# Patient Record
Sex: MALE | Race: WHITE | NOT HISPANIC OR LATINO | Employment: FULL TIME | URBAN - METROPOLITAN AREA
[De-identification: names, ages, dates, MRNs, and addresses within clinical notes are randomized per-mention and may not be internally consistent; named-entity substitution may affect disease eponyms.]

---

## 2017-12-21 ENCOUNTER — RADIANT APPOINTMENT (OUTPATIENT)
Dept: GENERAL RADIOLOGY | Facility: CLINIC | Age: 32
End: 2017-12-21
Attending: FAMILY MEDICINE
Payer: COMMERCIAL

## 2017-12-21 ENCOUNTER — OFFICE VISIT (OUTPATIENT)
Dept: FAMILY MEDICINE | Facility: CLINIC | Age: 32
End: 2017-12-21
Payer: COMMERCIAL

## 2017-12-21 VITALS
TEMPERATURE: 97.5 F | BODY MASS INDEX: 29.41 KG/M2 | DIASTOLIC BLOOD PRESSURE: 87 MMHG | HEIGHT: 70 IN | SYSTOLIC BLOOD PRESSURE: 142 MMHG | HEART RATE: 60 BPM | WEIGHT: 205.4 LBS

## 2017-12-21 DIAGNOSIS — M25.531 RIGHT WRIST PAIN: Primary | ICD-10-CM

## 2017-12-21 DIAGNOSIS — M25.531 RIGHT WRIST PAIN: ICD-10-CM

## 2017-12-21 PROCEDURE — 73110 X-RAY EXAM OF WRIST: CPT | Mod: RT

## 2017-12-21 PROCEDURE — 99214 OFFICE O/P EST MOD 30 MIN: CPT | Performed by: FAMILY MEDICINE

## 2017-12-21 RX ORDER — IBUPROFEN 600 MG/1
600 TABLET, FILM COATED ORAL EVERY 6 HOURS
Qty: 40 TABLET | Refills: 0 | Status: SHIPPED | OUTPATIENT
Start: 2017-12-21 | End: 2023-07-12

## 2017-12-21 ASSESSMENT — PAIN SCALES - GENERAL: PAINLEVEL: SEVERE PAIN (7)

## 2017-12-21 NOTE — PROGRESS NOTES
SUBJECTIVE:                                                    Fredi Menendez is 32 year old male   Chief Complaint   Patient presents with     Wrist Pain     Right wrist pain for 1.5 weeks. No injury noted, woke up with pain and throbbing in wrist. Has tried Tylenol to date no help. Pain with use, grasping, and rotation.         Problem list and histories reviewed & adjusted, as indicated.  Additional history: as documented    Patient Active Problem List   Diagnosis     Other joint derangement, not elsewhere classified, lower leg     ADD (attention deficit disorder)     CARDIOVASCULAR SCREENING; LDL GOAL LESS THAN 160     GERD (gastroesophageal reflux disease)     Hyperlipidemia LDL goal <130     Past Surgical History:   Procedure Laterality Date     SURGICAL HISTORY OF -   age 3    herniorrhaphy       Social History   Substance Use Topics     Smoking status: Never Smoker     Smokeless tobacco: Current User      Comment: chewed for a few years     Alcohol use Yes      Comment: 1-2 drinks per night     Family History   Problem Relation Age of Onset     Allergies Mother      DIABETES Father      CEREBROVASCULAR DISEASE Father      DIABETES Paternal Grandfather      CEREBROVASCULAR DISEASE Paternal Grandfather      Respiratory Maternal Grandmother      emphysema         Current Outpatient Prescriptions   Medication Sig Dispense Refill     order for DME Equipment being ordered: Splint 1 Device 0     ibuprofen (ADVIL/MOTRIN) 600 MG tablet Take 1 tablet (600 mg) by mouth every 6 hours 40 tablet 0     Allergies   Allergen Reactions     Nasacort [Corticosteroids]      Septra [Bactrim]      Recent Labs   Lab Test  01/26/12   0930  06/08/11   1130  03/24/11   0927   LDL  Cannot estimate LDL when triglyceride exceeds 400 mg/dL  121  87  Cannot estimate LDL when triglyceride exceeds 400 mg/dL   HDL  56   --   52   TRIG  579*   --   666*   CR   --    --   1.10   GFRESTIMATED   --    --   82   GFRESTBLACK   --    --    ">90   POTASSIUM   --    --   4.7      BP Readings from Last 3 Encounters:   12/21/17 142/87   03/21/16 114/80   08/10/12 132/76    Wt Readings from Last 3 Encounters:   12/21/17 205 lb 6.4 oz (93.2 kg)   03/21/16 205 lb (93 kg)   01/26/12 183 lb (83 kg)         ROS:  Constitutional, HEENT, cardiovascular, pulmonary, gi and gu systems are negative, except as otherwise noted.    OBJECTIVE:                                                    /87  Pulse 60  Temp 97.5  F (36.4  C) (Tympanic)  Ht 5' 9.5\" (1.765 m)  Wt 205 lb 6.4 oz (93.2 kg)  BMI 29.9 kg/m2  GENERAL APPEARANCE ADULT: Alert, no acute distress  MS: extremities normal, no peripheral edema  Left wrist exam normal.  Left wrist appearance and range of motion, swelling-slight, deformity-none, erythema-no, warmth-no, tenderness- left wrist entirely, normal range of motion  hand exam Normal hand appearance and range of motion, non-tender  SKIN: no suspicious lesions or rashes  NEURO: Alert, oriented, speech and mentation normal  PSYCH: mentation appears normal., affect and mood normal  Diagnostic Test Results:  Xray right wrist normal     ASSESSMENT/PLAN:                                                    1. Right wrist pain  Strain, overuse, recheck 2 weeks if not improved.  Wrist splint for comfort  - XR Wrist Right G/E 3 Views; Future  - order for DME; Equipment being ordered: Splint  Dispense: 1 Device; Refill: 0  - ibuprofen (ADVIL/MOTRIN) 600 MG tablet; Take 1 tablet (600 mg) by mouth every 6 hours  Dispense: 40 tablet; Refill: 0    Juana Conrad MD  [facility]    "

## 2017-12-21 NOTE — PATIENT INSTRUCTIONS
Scheduled over the counter pain meds.     Ibuprofen 600mg orally every 6 hours scheduled for 10 days and/or   Tylenol 650 mg two orally every 12 hours scheduled for 10 days       Can take additional 1300 mg in a 24 hour time     Taking pain medication on a schedule will help to get ahead of the pain.  You are not waiting for the pain to take the medicine.  An example of scheduled routine for every 6 hours is to take 600 mg of ibuprofen at 6 am, 12 pm, 6 pm, 12 am. If this is not working to manage your pain add Tylenol 1300 mg at 6 am and 6 pm.      If you get good pain relief and it returns when stopped consider using these medications longer.  Risks from long term use of ibuprofen and tylenol  are minimal.  Ibuprofen long term can give you stomach inflammation and stomach ulcers, taking with food helps prevent this.  Consuming tylenol when using alcohol regularily can stress the liver.      Maximum dose of ibuprofen is 3200 mg daily.  Maximum dose of Tylenol is 4000 mg a day.  Those with kidney or liver disease need to use less or none at all.   It is safe to use both Tylenol and ibuprofen together scheduled.  Expect the pain relief effect to be more than additive.    Remember that most narcotic pain medications may have ibuprofen or acetaminophen in them and that must be counted into the total amount of either medication for the 24 hours.

## 2017-12-21 NOTE — MR AVS SNAPSHOT
After Visit Summary   12/21/2017    Fredi Menendez    MRN: [id number]           Patient Information     Date Of Birth          1985        Visit Information        Provider Department      12/21/2017 1:00 PM Juana Conrad MD [facility]        Today's Diagnoses     Right wrist pain    -  1      Care Instructions    Scheduled over the counter pain meds.     Ibuprofen 600mg orally every 6 hours scheduled for 10 days and/or   Tylenol 650 mg two orally every 12 hours scheduled for 10 days       Can take additional 1300 mg in a 24 hour time     Taking pain medication on a schedule will help to get ahead of the pain.  You are not waiting for the pain to take the medicine.  An example of scheduled routine for every 6 hours is to take 600 mg of ibuprofen at 6 am, 12 pm, 6 pm, 12 am. If this is not working to manage your pain add Tylenol 1300 mg at 6 am and 6 pm.      If you get good pain relief and it returns when stopped consider using these medications longer.  Risks from long term use of ibuprofen and tylenol  are minimal.  Ibuprofen long term can give you stomach inflammation and stomach ulcers, taking with food helps prevent this.  Consuming tylenol when using alcohol regularily can stress the liver.      Maximum dose of ibuprofen is 3200 mg daily.  Maximum dose of Tylenol is 4000 mg a day.  Those with kidney or liver disease need to use less or none at all.   It is safe to use both Tylenol and ibuprofen together scheduled.  Expect the pain relief effect to be more than additive.    Remember that most narcotic pain medications may have ibuprofen or acetaminophen in them and that must be counted into the total amount of either medication for the 24 hours.            Follow-ups after your visit        Who to contact     If you have questions or need follow up information about today's clinic visit or your schedule please contact [facility] directly at  "[phone].  Normal or non-critical lab and imaging results will be communicated to you by MyChart, letter or phone within 4 business days after the clinic has received the results. If you do not hear from us within 7 days, please contact the clinic through [employer] or phone. If you have a critical or abnormal lab result, we will notify you by phone as soon as possible.  Submit refill requests through [employer] or call your pharmacy and they will forward the refill request to us. Please allow 3 business days for your refill to be completed.          Additional Information About Your Visit        [employer] Information     [employer] gives you secure access to your electronic health record. If you see a primary care provider, you can also send messages to your care team and make appointments. If you have questions, please call your primary care clinic.  If you do not have a primary care provider, please call [phone] and they will assist you.        Care EveryWhere ID     This is your Care EveryWhere ID. This could be used by other organizations to access your [place] medical records  [id number]        Your Vitals Were     Pulse Temperature Height BMI (Body Mass Index)          60 97.5  F (36.4  C) (Tympanic) 5' 9.5\" (1.765 m) 29.9 kg/m2         Blood Pressure from Last 3 Encounters:   12/21/17 142/87   03/21/16 114/80   08/10/12 132/76    Weight from Last 3 Encounters:   12/21/17 205 lb 6.4 oz (93.2 kg)   03/21/16 205 lb (93 kg)   01/26/12 183 lb (83 kg)                 Today's Medication Changes          These changes are accurate as of: 12/21/17  1:33 PM.  If you have any questions, ask your nurse or doctor.               Start taking these medicines.        Dose/Directions    ibuprofen 600 MG tablet   Commonly known as:  ADVIL/MOTRIN   Used for:  Right wrist pain   Started by:  Juana Conrad MD        Dose:  600 mg   Take 1 tablet (600 mg) by mouth every 6 hours   Quantity:  40 tablet   Refills:  0       " order for DME   Used for:  Right wrist pain   Started by:  Juana Conrad MD        Equipment being ordered: Splint   Quantity:  1 Device   Refills:  0         Stop taking these medicines if you haven't already. Please contact your care team if you have questions.     amoxicillin-clavulanate 875-125 MG per tablet   Commonly known as:  AUGMENTIN   Stopped by:  Juana Conrad MD                Where to get your medicines      These medications were sent to [place] [address] [facility] - [address] [address]     Phone:  [phone]     ibuprofen 600 MG tablet         Some of these will need a paper prescription and others can be bought over the counter.  Ask your nurse if you have questions.     Bring a paper prescription for each of these medications     order for DME                Primary Care Provider Office Phone # Fax #    Alex Martinez [phone] [address] [facility] [address]        Equal Access to Services     JULIO MARINELLI AH: Hadii niels sanchez hadasho Soomaali, waaxda luqadaha, qaybta kaalmada adeegyada, waxay idiin haycarolinen david rivera. So [facility] [phone].    ATENCIÓN: Si habla español, tiene a saleem disposición servicios gratuitos de asistencia lingüística. Llame al [phone].    We comply with applicable federal civil rights laws and [place] laws. We do not discriminate on the basis of race, color, national origin, age, disability, sex, sexual orientation, or gender identity.            Thank you!     Thank you for choosing [facility]  for your care. Our goal is always to provide you with excellent care. Hearing back from our patients is one way we can continue to improve our services. Please take a few minutes to complete the written survey that you may receive in the mail after your visit with us. Thank you!             Your Updated Medication List - Protect others around you: Learn how  to safely use, store and throw away your medicines at www.disposemymeds.org.          This list is accurate as of: 12/21/17  1:33 PM.  Always use your most recent med list.                   Brand Name Dispense Instructions for use Diagnosis    ibuprofen 600 MG tablet    ADVIL/MOTRIN    40 tablet    Take 1 tablet (600 mg) by mouth every 6 hours    Right wrist pain       order for DME     1 Device    Equipment being ordered: Splint    Right wrist pain

## 2017-12-21 NOTE — NURSING NOTE
"Initial /87  Pulse 60  Temp 97.5  F (36.4  C) (Tympanic)  Ht 5' 9.5\" (1.765 m)  Wt 205 lb 6.4 oz (93.2 kg)  BMI 29.9 kg/m2 Estimated body mass index is 29.9 kg/(m^2) as calculated from the following:    Height as of this encounter: 5' 9.5\" (1.765 m).    Weight as of this encounter: 205 lb 6.4 oz (93.2 kg). .      "

## 2019-11-03 ENCOUNTER — HEALTH MAINTENANCE LETTER (OUTPATIENT)
Age: 34
End: 2019-11-03

## 2020-11-16 ENCOUNTER — HEALTH MAINTENANCE LETTER (OUTPATIENT)
Age: 35
End: 2020-11-16

## 2021-09-12 ENCOUNTER — HEALTH MAINTENANCE LETTER (OUTPATIENT)
Age: 36
End: 2021-09-12

## 2022-01-02 ENCOUNTER — HEALTH MAINTENANCE LETTER (OUTPATIENT)
Age: 37
End: 2022-01-02

## 2022-08-25 ENCOUNTER — E-CONSULT (OUTPATIENT)
Dept: PULMONOLOGY | Facility: OTHER | Age: 37
End: 2022-08-25

## 2022-08-25 ENCOUNTER — OFFICE VISIT (OUTPATIENT)
Dept: FAMILY MEDICINE | Facility: CLINIC | Age: 37
End: 2022-08-25
Payer: COMMERCIAL

## 2022-08-25 VITALS
HEART RATE: 66 BPM | OXYGEN SATURATION: 99 % | RESPIRATION RATE: 16 BRPM | DIASTOLIC BLOOD PRESSURE: 88 MMHG | BODY MASS INDEX: 31.55 KG/M2 | TEMPERATURE: 96.8 F | HEIGHT: 70 IN | SYSTOLIC BLOOD PRESSURE: 132 MMHG | WEIGHT: 220.4 LBS

## 2022-08-25 DIAGNOSIS — G47.19 EXCESSIVE DAYTIME SLEEPINESS: ICD-10-CM

## 2022-08-25 DIAGNOSIS — R06.83 SNORING: Primary | ICD-10-CM

## 2022-08-25 DIAGNOSIS — R06.81 APNEA: ICD-10-CM

## 2022-08-25 DIAGNOSIS — R03.0 ELEVATED BLOOD PRESSURE READING WITHOUT DIAGNOSIS OF HYPERTENSION: ICD-10-CM

## 2022-08-25 DIAGNOSIS — Z00.00 ROUTINE PHYSICAL EXAMINATION: Primary | ICD-10-CM

## 2022-08-25 DIAGNOSIS — G47.10 HYPERSOMNIA: ICD-10-CM

## 2022-08-25 DIAGNOSIS — Z13.220 SCREENING FOR HYPERLIPIDEMIA: ICD-10-CM

## 2022-08-25 DIAGNOSIS — Z11.4 SCREENING FOR HIV (HUMAN IMMUNODEFICIENCY VIRUS): ICD-10-CM

## 2022-08-25 DIAGNOSIS — Z11.59 NEED FOR HEPATITIS C SCREENING TEST: ICD-10-CM

## 2022-08-25 DIAGNOSIS — R06.83 LOUD SNORING: ICD-10-CM

## 2022-08-25 LAB
ANION GAP SERPL CALCULATED.3IONS-SCNC: 5 MMOL/L (ref 3–14)
BUN SERPL-MCNC: 10 MG/DL (ref 7–30)
CALCIUM SERPL-MCNC: 8.8 MG/DL (ref 8.5–10.1)
CHLORIDE BLD-SCNC: 107 MMOL/L (ref 94–109)
CHOLEST SERPL-MCNC: 232 MG/DL
CO2 SERPL-SCNC: 26 MMOL/L (ref 20–32)
CREAT SERPL-MCNC: 0.78 MG/DL (ref 0.52–1.25)
ERYTHROCYTE [DISTWIDTH] IN BLOOD BY AUTOMATED COUNT: 12.1 % (ref 10–15)
FASTING STATUS PATIENT QL REPORTED: ABNORMAL
GFR SERPL CREATININE-BSD FRML MDRD: >90 ML/MIN/1.73M2
GLUCOSE BLD-MCNC: 109 MG/DL (ref 70–99)
HBA1C MFR BLD: 5.8 % (ref 0–5.6)
HCT VFR BLD AUTO: 47.3 % (ref 35–53)
HDLC SERPL-MCNC: 47 MG/DL
HGB BLD-MCNC: 16.5 G/DL (ref 11.7–17.7)
LDLC SERPL CALC-MCNC: 138 MG/DL
MCH RBC QN AUTO: 29.8 PG (ref 26.5–33)
MCHC RBC AUTO-ENTMCNC: 34.9 G/DL (ref 31.5–36.5)
MCV RBC AUTO: 86 FL (ref 78–100)
NONHDLC SERPL-MCNC: 185 MG/DL
PLATELET # BLD AUTO: 234 10E3/UL (ref 150–450)
POTASSIUM BLD-SCNC: 4.5 MMOL/L (ref 3.4–5.3)
RBC # BLD AUTO: 5.53 10E6/UL (ref 3.8–5.9)
SODIUM SERPL-SCNC: 138 MMOL/L (ref 133–144)
TRIGL SERPL-MCNC: 237 MG/DL
TSH SERPL DL<=0.005 MIU/L-ACNC: 1.85 MU/L (ref 0.4–4)
WBC # BLD AUTO: 6 10E3/UL (ref 4–11)

## 2022-08-25 PROCEDURE — 90715 TDAP VACCINE 7 YRS/> IM: CPT | Performed by: NURSE PRACTITIONER

## 2022-08-25 PROCEDURE — 80061 LIPID PANEL: CPT | Performed by: NURSE PRACTITIONER

## 2022-08-25 PROCEDURE — 87389 HIV-1 AG W/HIV-1&-2 AB AG IA: CPT | Performed by: NURSE PRACTITIONER

## 2022-08-25 PROCEDURE — 99385 PREV VISIT NEW AGE 18-39: CPT | Mod: 25 | Performed by: NURSE PRACTITIONER

## 2022-08-25 PROCEDURE — 84443 ASSAY THYROID STIM HORMONE: CPT | Performed by: NURSE PRACTITIONER

## 2022-08-25 PROCEDURE — 85027 COMPLETE CBC AUTOMATED: CPT | Performed by: NURSE PRACTITIONER

## 2022-08-25 PROCEDURE — 36415 COLL VENOUS BLD VENIPUNCTURE: CPT | Performed by: NURSE PRACTITIONER

## 2022-08-25 PROCEDURE — 99207 E-CONSULT TO SLEEP MEDICINE (ADULT OUTPT PROVIDER TO SPECIALIST WRITTEN QUESTION & RESPONSE): CPT | Performed by: NURSE PRACTITIONER

## 2022-08-25 PROCEDURE — 80048 BASIC METABOLIC PNL TOTAL CA: CPT | Performed by: NURSE PRACTITIONER

## 2022-08-25 PROCEDURE — 83036 HEMOGLOBIN GLYCOSYLATED A1C: CPT | Performed by: NURSE PRACTITIONER

## 2022-08-25 PROCEDURE — 99213 OFFICE O/P EST LOW 20 MIN: CPT | Mod: 25 | Performed by: NURSE PRACTITIONER

## 2022-08-25 PROCEDURE — 90471 IMMUNIZATION ADMIN: CPT | Performed by: NURSE PRACTITIONER

## 2022-08-25 PROCEDURE — 86803 HEPATITIS C AB TEST: CPT | Performed by: NURSE PRACTITIONER

## 2022-08-25 ASSESSMENT — ENCOUNTER SYMPTOMS
COUGH: 0
HEADACHES: 0
NAUSEA: 0
BREAST MASS: 0
SHORTNESS OF BREATH: 0
HEARTBURN: 0
FEVER: 0
SORE THROAT: 0
NERVOUS/ANXIOUS: 0
ABDOMINAL PAIN: 0
DIARRHEA: 0
JOINT SWELLING: 0
DYSURIA: 0
PALPITATIONS: 0
EYE PAIN: 0
PARESTHESIAS: 0
MYALGIAS: 0
HEMATOCHEZIA: 0
ARTHRALGIAS: 0
CONSTIPATION: 0
CHILLS: 0
FREQUENCY: 0
DIZZINESS: 0
HEMATURIA: 0
WEAKNESS: 0

## 2022-08-25 NOTE — PROGRESS NOTES
SUBJECTIVE:   CC: Fredi Menendez is an 37 year old male who presents for preventative health visit.       Patient has been advised of split billing requirements and indicates understanding: Yes  Healthy Habits:     Getting at least 3 servings of Calcium per day:  Yes    Bi-annual eye exam:  NO    Dental care twice a year:  NO    Sleep apnea or symptoms of sleep apnea:  Daytime drowsiness and Excessive snoring    Diet:  Regular (no restrictions)    Frequency of exercise:  2-3 days/week    Duration of exercise:  15-30 minutes    Taking medications regularly:  Not Applicable    Medication side effects:  None    PHQ-2 Total Score: 0    Additional concerns today:  No      Insomnia  Onset: 1-1.5 months   Description: can sleep all night and still not rested the next day- nods off at work, hard to focus  Time to fall asleep (sleep latency): 30 minutes  Middle of night awakening:  YES- not all the time  Early morning awakening:  YES    Progression of Symptoms:  Improving slightly    Accompanying Signs & Symptoms: heartburn, burns in throat at night-stopped eating later at night which helped   Daytime sleepiness/napping: YES  Excessive snoring/apnea: YES  Restless legs: no  Frequent urination: no   Chronic pain:  no    History:  Prior Insomnia: no     Precipitating factors:   New stressful situation: no  Caffeine intake: YES- moderate   OTC decongestants: no   Any new medications: no     Alleviating factors:  Self medicating (alcohol, etc.):  No    Therapies Tried and outcome: OTC sleep aids        Today's PHQ-2 Score:   PHQ-2 ( 1999 [employer]) 8/25/2022   Q1: Little interest or pleasure in doing things 0   Q2: Feeling down, depressed or hopeless 0   PHQ-2 Score 0   Q1: Little interest or pleasure in doing things Not at all   Q2: Feeling down, depressed or hopeless Not at all   PHQ-2 Score 0       Abuse: Current or Past(Physical, Sexual or Emotional)- No  Do you feel safe in your environment? Yes    Have you ever done  Advance Care Planning? (For example, a Health Directive, POLST, or a discussion with a medical provider or your loved ones about your wishes): No, advance care planning information given to patient to review.  Patient declined advance care planning discussion at this time.    Social History     Tobacco Use     Smoking status: Never Smoker     Smokeless tobacco: Current User     Tobacco comment: chewed for a few years   Substance Use Topics     Alcohol use: Yes     Comment: occ     If you drink alcohol do you typically have >3 drinks per day or >7 drinks per week? No    Alcohol Use 8/25/2022   Prescreen: >3 drinks/day or >7 drinks/week? No   Prescreen: >3 drinks/day or >7 drinks/week? -       Last PSA: No results found for: PSA    Reviewed orders with patient. Reviewed health maintenance and updated orders accordingly - Yes  BP Readings from Last 3 Encounters:   08/25/22 132/88   12/21/17 142/87   03/21/16 114/80    Wt Readings from Last 3 Encounters:   08/25/22 100 kg (220 lb 6.4 oz)   12/21/17 93.2 kg (205 lb 6.4 oz)   03/21/16 93 kg (205 lb)                  Patient Active Problem List   Diagnosis     Other joint derangement, not elsewhere classified, lower leg     ADD (attention deficit disorder)     CARDIOVASCULAR SCREENING; LDL GOAL LESS THAN 160     GERD (gastroesophageal reflux disease)     Hyperlipidemia LDL goal <130     Excessive daytime sleepiness     Elevated blood pressure reading without diagnosis of hypertension     Past Surgical History:   Procedure Laterality Date     SURGICAL HISTORY OF -   age 3    herniorrhaphy       Social History     Tobacco Use     Smoking status: Never Smoker     Smokeless tobacco: Current User     Tobacco comment: chewed for a few years   Substance Use Topics     Alcohol use: Yes     Comment: occ     Family History   Problem Relation Age of Onset     Allergies Mother      Diabetes Father      Cerebrovascular Disease Father      Diabetes Paternal Grandfather       "Cerebrovascular Disease Paternal Grandfather      Respiratory Maternal Grandmother         emphysema         Current Outpatient Medications   Medication Sig Dispense Refill     ibuprofen (ADVIL/MOTRIN) 600 MG tablet Take 1 tablet (600 mg) by mouth every 6 hours 40 tablet 0       Reviewed and updated as needed this visit by clinical staff   Tobacco  Allergies    Med Hx  Surg Hx  Fam Hx  Soc Hx          Reviewed and updated as needed this visit by Provider                     Review of Systems   Constitutional: Negative for chills and fever.   HENT: Negative for congestion, ear pain, hearing loss and sore throat.    Eyes: Negative for pain and visual disturbance.   Respiratory: Negative for cough and shortness of breath.    Cardiovascular: Negative for chest pain and palpitations.   Gastrointestinal: Negative for abdominal pain, constipation, diarrhea and nausea.   Genitourinary: Negative for dysuria, frequency, genital sores, hematuria and urgency.   Musculoskeletal: Negative for arthralgias, joint swelling and myalgias.   Skin: Negative for rash.   Neurological: Negative for dizziness, weakness and headaches.   Psychiatric/Behavioral: The patient is not nervous/anxious.          OBJECTIVE:   BP (!) 142/112   Pulse 66   Temp 96.8  F (36  C) (Tympanic)   Resp 16   Ht 1.765 m (5' 9.5\")   Wt 100 kg (220 lb 6.4 oz)   SpO2 99%   BMI 32.08 kg/m      Physical Exam  GENERAL: alert and no distress  EYES: Eyes grossly normal to inspection, PERRL and conjunctivae and sclerae normal  HENT: normal cephalic/atraumatic, ear canals and TM's normal, nose and mouth without ulcers or lesions, oropharynx clear, oral mucous membranes moist and oropharxnx crowded  NECK: no adenopathy, no asymmetry, masses, or scars and thyroid normal to palpation  RESP: lungs clear to auscultation - no rales, rhonchi or wheezes  CV: regular rate and rhythm, normal S1 S2, no S3 or S4, no murmur, click or rub, no peripheral edema and peripheral " "pulses strong  ABDOMEN: soft, nontender, without hepatosplenomegaly or masses and no palpable or pulsatile masses  MS: no gross musculoskeletal defects noted, no edema  SKIN: no suspicious lesions or rashes  NEURO: Normal strength and tone, mentation intact and speech normal  PSYCH: mentation appears normal, affect normal/bright    Diagnostic Test Results:  Labs reviewed in Epic    ASSESSMENT/PLAN:       ICD-10-CM    1. Routine physical examination  Z00.00 Basic metabolic panel  (Ca, Cl, CO2, Creat, Gluc, K, Na, BUN)     Hemoglobin A1c     Basic metabolic panel  (Ca, Cl, CO2, Creat, Gluc, K, Na, BUN)     Hemoglobin A1c   2. Excessive daytime sleepiness  G47.19 TSH with free T4 reflex     CBC with platelets     TSH with free T4 reflex     CBC with platelets     Adult E-Consult to Sleep Medicine (Outpt Provider to Specialist Written Question & Response)   3. Elevated blood pressure reading without diagnosis of hypertension  R03.0    4. Screening for HIV (human immunodeficiency virus)  Z11.4 HIV Antigen Antibody Combo     HIV Antigen Antibody Combo   5. Need for hepatitis C screening test  Z11.59 Hepatitis C Screen Reflex to HCV RNA Quant and Genotype     Hepatitis C Screen Reflex to HCV RNA Quant and Genotype   6. Screening for hyperlipidemia  Z13.220 Lipid panel reflex to direct LDL Non-fasting     Lipid panel reflex to direct LDL Non-fasting   7. Loud snoring  R06.83 Adult E-Consult to Sleep Medicine (Outpt Provider to Specialist Written Question & Response)           COUNSELING:   Reviewed preventive health counseling, as reflected in patient instructions  Special attention given to:        Regular exercise       Healthy diet/nutrition    Estimated body mass index is 32.08 kg/m  as calculated from the following:    Height as of this encounter: 1.765 m (5' 9.5\").    Weight as of this encounter: 100 kg (220 lb 6.4 oz).     Weight management plan: Discussed healthy diet and exercise guidelines    He reports that he " has never smoked. He uses smokeless tobacco.      Counseling Resources:  ATP IV Guidelines  Pooled Cohorts Equation Calculator  FRAX Risk Assessment  ICSI Preventive Guidelines  Dietary Guidelines for Americans, 2010  USDA's MyPlate  ASA Prophylaxis  Lung CA Screening    GILBERTO Sanabria CNP  [facility]

## 2022-08-25 NOTE — PROGRESS NOTES
ALL SMARTFIELDS MUST BE COMPLETED FOR PATIENT CARE AND BILLING    8/25/2022     E-Consult has been accepted.    Interprofessional consultation requested by:  Rosy Arrieta APRN CNP      Clinical Question/Purpose: MY CLINICAL QUESTION IS: need for sleep study    Patient assessment and information reviewed:     - Does the patient snore on most nights? Yes    - Is the patient excessively tired during the day? Yes    - Has the patient been observed to be stopping breathing in sleep or gasping/ choking in sleep? Yes- brings in recording of loud snoring and periods of pauses in the snoring- assuming apnea. Narrow airway on exam    - Does the patient have any of the following?    - Significant insomnia? No    - Any parasomnias (abnormal behaviors in sleep)? No    - Excessive leg movements in sleep? No    - If a sleep study is requested, does the patient prefer in lab sleep study or home sleep testing?    - Is the patient already on CPAP or BIPAP? No     BMI ~32.1.    STOP-BANG score of 4 (snore, hypersomnia, observed apnea, male gender) with unknown neck circumference.    Recommendations:     Appears to be appropriate candidate for home sleep testing.  Orders placed for [employer] home sleep testing, we will contact patient with results.      The recommendations provided in this E-Consult are based on a review of clinical data pertinent to the clinical question presented, without a review of the patient's complete medical record or, the benefit of a comprehensive in-person or virtual patient evaluation. This consultation should not replace the clinical judgement and evaluation of the provider ordering this E-Consult. Any new clinical issues, or changes in patient status since the filing of this E-Consult will need to be taken into account when assessing these recommendations. Please contact me if you have further questions.    My total time spent reviewing clinical information and formulating assessment was 5  minutes.    Report sent automatically to requesting provider once signed.     Son Kline MD, MD

## 2022-08-26 LAB
HCV AB SERPL QL IA: NONREACTIVE
HIV 1+2 AB+HIV1 P24 AG SERPL QL IA: NONREACTIVE

## 2022-08-26 NOTE — RESULT ENCOUNTER NOTE
"Huy Rai    Your blood counts are normal- no anemia.  You are pre-diabetic, recommend heart healthy diet and increase physical activity to avoid progressing to type 2 diabetes. Cholesterol levels are above ideal. No need for medication at this time, just dietary changes. Thyroid function is normal. Kidneys look good. Your sleep study was approved, you should be hearing from them. Please let us know if you have any questions.     Take care,    GILBERTO Walter CNP    TEST DESCRIPTIONS   CBC (Complete Blood Count) includes hemoglobin, hematocrit, white blood cells, etc. This test can be used to detect anemia, infection, and abnormalities in blood cells.   Cholesterol is one of the blood fats (lipids) and is the building block used by the body for cell wall and hormone production. Increased levels of cholesterol have been proven to directly contribute to heart disease and strokes.   Triglycerides are one of the blood fats (lipids) and are thought to be associated with heart disease. If you have had anything to eat or drink other than water for 12 hours before the test and your level is high, you should have the test repeated after a 12 hour fast. Abnormally high results may also be associated with diabetes, kidney and liver diseases.   LDL is the low density lipoprotein component of the cholesterol. It is the harmful substance that deposits cholesterol on the artery walls contributing to heart attacks and strokes. A high LDL level is associated with higher risk of coronary heart disease.   HDL stands for high density lipoprotein and refers to the so-called \"good\" cholesterol. HDL picks up excess cholesterol in the bloodstream and carries it back to the liver for disposal. Individuals with higher than average HDL seem to have a lower risk of coronary disease. Vigorous exercise will help increase the blood levels of HDL.   Risk Factor (Total cholesterol/HDL) is a commonly used ratio for cardiac risk " assessment. Less than 5.0 for men and 4.4 for women is ideal.   Sodium is an electrolyte useful in diagnosis of dehydration, diabetes, hypertension, or other diseases involving electrolyte imbalance. It also preserves the balance between calcium and potassium to maintain normal heart action and equilibrium of the body.   Potassium is also an electrolyte that works with sodium to regulate the body's water balance and normalize heart rhythm.   Glucose is a measure of blood sugar and is one of the tests for diabetes. If you have not been fasting, your level will often be high. A low glucose level may be a cause of weakness or dizziness. Blood sugar ranks with cholesterol as a causative factor in arteriosclerosis and heart attacks.   BUN & Creatinine are waste products excreted by the kidneys. A high BUN can be related to a high protein diet, heavy exercise, fever and infections, dehydration, kidney stones, and kidney disease. Creatinine elevation is less dependent on diet or exercise and better represents kidney impairment. Low values are not generally significant.   Calcium is a mineral in the blood controlled by the parathyroid glands and kidneys. It is important in the formation of bone, in muscle and nerve function, and in blood clotting. Disease of the parathyroid gland, diseased bones or kidneys, or defective absorption of calcium may cause abnormal levels from the intestine.   ALT, AST, Alk Phos are liver tests. Enzymes found in the liver as well as skeletal and cardiac muscle. Elevations can often be seen in alcoholism, liver or heart disease. Slightly abnormal values are not considered significant.   TSH stands for Thyroid Stimulating Hormone. A sensitive test used to determine how the thyroid gland is functioning. The thyroid gland produces hormones that control the body's metabolism. When too few hormones are produced (increased TSH), hypothyroidism occurs which can cause fatigue, sensitivity to cold, and  weight gain - an overall slowing down of bodily functions. When too many thyroid hormones are produces (or decreased TSH), it creates a condition call hyperthyroidism (such as Graves' disease) which causes rapid heartbeat, weight loss, and dizziness, among other symptoms.   PSA stands for Prostate Specific Antigen. Elevated levels of PSA can increase with trauma, infection, inflammation, or disease processes in the prostate such as BPH (Benigh Prostatic Hypertrophy) or cancer.   Glycohemoglobin or HgBA1C is a 3-month average of blood sugar

## 2022-09-08 ENCOUNTER — MYC MEDICAL ADVICE (OUTPATIENT)
Dept: FAMILY MEDICINE | Facility: CLINIC | Age: 37
End: 2022-09-08

## 2022-09-09 NOTE — TELEPHONE ENCOUNTER
Writer informed patient of sleep clinic number and Dr Kline's name via [employer] message.    Routed back to Yessenia Giordano for review.    Cole T[facility]

## 2022-09-09 NOTE — TELEPHONE ENCOUNTER
Huy Gallegos,     Please see my chart message.   I reviewed the e-consult and the order for home sleep test  How can I help expedite this? there is no phone number or direction in the consult  Forwarded to Yessenia Cerna RN on 9/9/2022 at 7:39 AM    Yessenia,   Not sure if you know how to help with home sleep tests?    Sara Cerna RN on 9/9/2022 at 7:45 AM

## 2022-09-09 NOTE — TELEPHONE ENCOUNTER
He should be getting a call from the sleep clinic on instructions for the home sleep test and scheduling a follow up. Dr. Kline did order this. You can call sleep and inquire.     [phone].

## 2022-11-04 ENCOUNTER — VIRTUAL VISIT (OUTPATIENT)
Dept: SLEEP MEDICINE | Facility: CLINIC | Age: 37
End: 2022-11-04
Payer: COMMERCIAL

## 2022-11-04 DIAGNOSIS — G47.10 HYPERSOMNIA: ICD-10-CM

## 2022-11-04 DIAGNOSIS — R06.81 APNEA: ICD-10-CM

## 2022-11-04 DIAGNOSIS — R06.83 SNORING: ICD-10-CM

## 2022-11-04 PROCEDURE — 95800 SLP STDY UNATTENDED: CPT | Performed by: STUDENT IN AN ORGANIZED HEALTH CARE EDUCATION/TRAINING PROGRAM

## 2022-11-04 NOTE — PROGRESS NOTES
WATCHPAT HOME SLEEP TEST WAS REGISTERED AND MAILED OUT TODAY VIA [facility].  PATIENT NOTIFIED BY [employer] MESSAGE

## 2022-11-14 NOTE — PROGRESS NOTES
Watch Pat has been scored using rule 1B, 4%.  Patient to follow up with provider to determine appropriate therapy.    PAT AHI: 82.8    Ordering Provider: Raghu Mackenzie MD

## 2022-11-19 ENCOUNTER — HEALTH MAINTENANCE LETTER (OUTPATIENT)
Age: 37
End: 2022-11-19

## 2022-11-30 NOTE — PROCEDURES
"WatchPAT - HOME SLEEP STUDY INTERPRETATION    Patient: Fredi Menendez  MRN: [id number]  YOB: 1985  Study Date: 11/4/2022  Referring Provider: System, Provider Not In  Ordering Provider: Kirsten Mackenzie MD      Chain of custody patient verification was not enabled.  Chain of custody verification was not present throughout the entire study.     Indications for Home Study: Fredi Menendez is a 37 year old adult with a history of HLD, GERD, ans ADD who who presents with symptoms suggestive of obstructive sleep apnea.    Estimated body mass index is 32.08 kg/m  as calculated from the following:    Height as of 8/25/22: 1.765 m (5' 9.5\").    Weight as of 8/25/22: 100 kg (220 lb 6.4 oz).  No data recorded  No data recorded    Data: A full night home sleep study was performed recording the standard physiologic parameters including peripheral arterial tonometry (PAT), sound/snoring, body position,  movement, sound, and oxygen saturation by pulse oximetry. Pulse rate was estimated by oximetry recording. Sleep staging (wake, REM, light, and deep sleep) was derived from PAT signal.  This study was considered adequate based on > 4 hours of quality oximetry and respiratory recording. As specified by the AASM Manual for the Scoring of Sleep and Associated events, version 2.3, Rule VIII.D 1B, 4% oxygen desaturation scoring for hypopneas is used as a standard of care on all home sleep apnea testing.    Total Recording Time: 7 hrs, 26 min  Total Sleep Time: 7 hrs, 12 min  % of Sleep Time REM: 11.3%    Respiratory:  Snoring: Snoring was present.  Respiratory events: The PAT respiratory disturbance index [pRDI] was 82.8 events per hour.  The PAT apnea/hypopnea index [pAHI] was 82.8 events per hour.  NIKHIL was 86.9 events per hour.  During REM sleep the pAHI was 72.0.  Of note the pAHIc was 26.2.  Sleep Associated Hypoxemia: sustained hypoxemia was present. Mean oxygen saturation was 87%.  Minimum was 61%.  " Time with saturation less than 88% was 212.8 minutes.    Heart Rate: By pulse oximetry normal rate was noted.     Position: Percent of time spent: supine -54%, prone -13.4%, on right -22.7%, on left -8.5%.  pAHI was 85.9 per hour supine, 81.5 per hour prone, 80.7 per hour on right side, and 74.1 per hour on left side.     Assessment:   Severe obstructive sleep apnea.  Sleep associated hypoxemia was present.    Recommendations:  Consider polysomnography with full night PAP titration.  Suggest optimizing sleep hygiene and avoiding sleep deprivation.  Weight management.    Diagnosis Code(s): Obstructive Sleep Apnea G47.33, Hypoxemia G47.36    Kristen Mackenzie MD, November 30, 2022   Diplomate, American Board of Internal Medicine, Sleep Medicine

## 2022-12-12 ENCOUNTER — VIRTUAL VISIT (OUTPATIENT)
Dept: SLEEP MEDICINE | Facility: CLINIC | Age: 37
End: 2022-12-12
Payer: COMMERCIAL

## 2022-12-12 VITALS — WEIGHT: 205 LBS | BODY MASS INDEX: 30.36 KG/M2 | HEIGHT: 69 IN

## 2022-12-12 DIAGNOSIS — G47.33 OBSTRUCTIVE SLEEP APNEA: Primary | ICD-10-CM

## 2022-12-12 PROCEDURE — 99215 OFFICE O/P EST HI 40 MIN: CPT | Mod: 95 | Performed by: STUDENT IN AN ORGANIZED HEALTH CARE EDUCATION/TRAINING PROGRAM

## 2022-12-12 ASSESSMENT — SLEEP AND FATIGUE QUESTIONNAIRES
HOW LIKELY ARE YOU TO NOD OFF OR FALL ASLEEP WHILE SITTING AND TALKING TO SOMEONE: MODERATE CHANCE OF DOZING
HOW LIKELY ARE YOU TO NOD OFF OR FALL ASLEEP WHEN YOU ARE A PASSENGER IN A CAR FOR AN HOUR WITHOUT A BREAK: HIGH CHANCE OF DOZING
HOW LIKELY ARE YOU TO NOD OFF OR FALL ASLEEP WHILE SITTING AND READING: HIGH CHANCE OF DOZING
HOW LIKELY ARE YOU TO NOD OFF OR FALL ASLEEP WHILE LYING DOWN TO REST IN THE AFTERNOON WHEN CIRCUMSTANCES PERMIT: HIGH CHANCE OF DOZING
HOW LIKELY ARE YOU TO NOD OFF OR FALL ASLEEP WHILE WATCHING TV: HIGH CHANCE OF DOZING
HOW LIKELY ARE YOU TO NOD OFF OR FALL ASLEEP WHILE SITTING QUIETLY AFTER LUNCH WITHOUT ALCOHOL: HIGH CHANCE OF DOZING
HOW LIKELY ARE YOU TO NOD OFF OR FALL ASLEEP IN A CAR, WHILE STOPPED FOR A FEW MINUTES IN TRAFFIC: HIGH CHANCE OF DOZING
HOW LIKELY ARE YOU TO NOD OFF OR FALL ASLEEP WHILE SITTING INACTIVE IN A PUBLIC PLACE: HIGH CHANCE OF DOZING

## 2022-12-12 ASSESSMENT — PAIN SCALES - GENERAL: PAINLEVEL: NO PAIN (0)

## 2022-12-12 NOTE — PROGRESS NOTES
Fredi is a 37 year old who is being evaluated via a billable video visit.      How would you like to obtain your AVS? MyChart  If the video visit is dropped, the invitation should be resent by: Text to cell phone: [phone]  Will anyone else be joining your video visit? Lauren Dawson    Video-Visit Details    Video Start Time: 4:01 PM    Type of service:  Video Visit    Video End Time:4:26 PM    Originating Location (pt. Location): Home    Distant Location (provider location):  On-site    Platform used for Video Visit: Joint venture between [facility]  Consultation Note    Name: Fredi Menendez MRN#: [id number]   Age: 37 year old YOB: 1985     Date of Consultation: 12/12/22  Consultation is requested by: No ref. provider found  Primary care provider: Provider Not In System    History of Present Illness:   Fredi Menendez is a 37 year old adult patient with no significant medical history  He is sent by No ref. provider found for a sleep consultation regarding Video Visit  .    Fredi NARENDRA Menendez main reason for visit: Sleep apnea  Patient states problem(s) started: 3 months ago  Fredi Menendez's goals for this visit: Figure out my sleep study    He has had a previous sleep study about several weeks ago.   Important findings: The PAT apnea/hypopnea index [pAHI] was 82.8 events per hour.  NIKHIL was 86.9 events per hour. During REM sleep the pAHI was 72.0.  Of note the pAHIc was 26.2.Sleep Associated Hypoxemia: sustained hypoxemia was present. Mean oxygen saturation was 87%.  Minimum was 61%.  Time with saturation less than 88% was 212.8 minutes.   .    CPAP: No    SLEEP-WAKE SCHEDULE:   Work/School Days: Patient goes to school/work: Yes   Usually gets into bed at 7 or 8pm  Takes patient about 10 to 20 minutes to fall asleep  Has trouble falling asleep 1if any nights per week  Wakes up in the middle of the night At least 2 on average times.  Wakes up due to  Uncertain;Other  He has trouble falling back asleep None times a week.   It usually takes Right away to get back to sleep  Patient is usually up at 3am  Uses alarm: Yes    Weekends/Non-work Days/All Other Days:  Usually gets into bed at 7 or 8 pm   Takes patient about 10 to 20 minutes to fall asleep  Patient is usually up at 3am  Uses alarm: Yes    Sleep Need  Patient gets  7 hours sleep on average   Patient thinks He needs about 6 hours sleep    Fredi Menendez prefers to sleep in this position(s): Back;Head Elevated   Patient states they do the following activities in bed:      Naps  Patient takes a purposeful nap 1 or 2 times a week and naps are usually 3 hours in duration  He feels better after a nap: No  He dozes off unintentionally 7 days a week days per week  Patient has had a driving accident or near-miss due to sleepiness/drowsiness: No      SLEEP DISRUPTIONS:  Breathing/Snoring  Patient snores:Yes  Other people complain about His snoring: Yes  Patient has been told He stops breathing in His sleep:Yes  He has issues with the following: Morning mouth dryness;Heartburn or reflux at night;Getting up to urinate more than once    Movement:  Patient gets pain, discomfort, with an urge to move:  No  It happens when He is resting:  No  It happens more at night:  No  Patient has been told Fredi Menendez kicks His legs at night:  No     Behaviors in Sleep:  Fredi Menendez has experienced the following behaviors while sleeping:    He has experienced sudden muscle weakness during the day: No     Is there anything else you would like your sleep provider to know: Super tired in morning dose off in seconds even while standing or driving    CAFFEINE AND OTHER SUBSTANCES:  Patient consumes caffeinated beverages per day:  Since the sleep apnea at least 4 to keep awake, had 0 to 1 before this  Last caffeine use is usually: 11am  List of any prescribed or over the counter stimulants that patient takes:    List  of any prescribed or over the counter sleep medication patient takes:    List of previous sleep medications that patient has tried:    Patient drinks alcohol to help them sleep: No  Patient drinks alcohol near bedtime: No    Family History:  Patient has a family member been diagnosed with a sleep disorder: Yes  Sleep apnea         SCALES:  EPWORTH SLEEPINESS SCALE    Lacon Sleepiness Scale ( RADHA Stoner  [id number]<br>ESS - [place]/English - Final version - 21 Nov 07 - [facility] [place].) 12/12/2022   Sitting and reading High chance of dozing   Watching TV High chance of dozing   Sitting, inactive in a public place (e.g. a theatre or a meeting) High chance of dozing   As a passenger in a car for an hour without a break High chance of dozing   Lying down to rest in the afternoon when circumstances permit High chance of dozing   Sitting and talking to someone Moderate chance of dozing   Sitting quietly after a lunch without alcohol High chance of dozing   In a car, while stopped for a few minutes in traffic High chance of dozing   Lacon Score (MC) 23   Lacon Score (Sleep) 23       INSOMNIA SEVERITY INDEX (BLANK)    Insomnia Severity Index (BLANK) 12/12/2022   Difficulty falling asleep 0   Difficulty staying asleep 2   Problems waking up too early 2   How SATISFIED/DISSATISFIED are you with your CURRENT sleep pattern? 3   How NOTICEABLE to others do you think your sleep problem is in terms of impairing the quality of your life? 3   How WORRIED/DISTRESSED are you about your current sleep problem? 4   To what extent do you consider your sleep problem to INTERFERE with your daily functioning (e.g. daytime fatigue, mood, ability to function at work/daily chores, concentration, memory, mood, etc.) CURRENTLY? 4   BLANK Total Score 18   Guidelines for Scoring/Interpretation:  Total score categories:  0-7 = No clinically significant insomnia   8-14 = Subthreshold insomnia   15-21 = Clinical insomnia (moderate severity)  22-28  = Clinical insomnia (severe)  Used via courtesy of www.myhealth.va.gov with permission from Emile Wolf PhD., [facility]      STOP BANG   STOP BANG Questionnaire (  2008, the American Society of Anesthesiologists, Inc. [employer] 12/12/2022   1. Snoring - Do you snore loudly (louder than talking or loud enough to be heard through closed doors)? Yes   2. Tired - Do you often feel tired, fatigued, or sleepy during daytime? Yes   3. Observed - Has anyone observed you stop breathing during your sleep? Yes   4. Blood pressure - Do you have or are you being treated for high blood pressure? No   5. BMI - BMI more than 35 kg/m2? No   6. Age - Age over 50 yr old? No   7. Neck circumference - Neck circumference greater than 40 cm? No   8. Gender - Gender male? Yes   STOP BANG Score (MC): 4 (High risk of RICARDA)   B/P Clinic: -   BMI Clinic: 30.27     PATIENT HEALTH QUESTIONNAIRE-9 (PHQ - 9)  PHQ-9 ([employer]) 3/24/2011   No Interest In Doing Things 1   Feeling Depressed 0   Trouble Sleeping 0   Tired / No Energy 1   No appetite or Over-Eating 0   Feeling Bad about Self 0   Trouble Concentrating 0   Moving Slow or Restless 0   Suicidal Thoughts 0   Total Score 2   Developed by Gela Suero, Kerry Dee, Pillo Rene and colleagues, with an educational willis from [employer] No permission required to reproduce, translate, display or distribute.      Allergies:    Allergies   Allergen Reactions     Nasacort [Corticosteroids]      Septra [Bactrim]        Medications:    Current Outpatient Medications   Medication Sig Dispense Refill     ibuprofen (ADVIL/MOTRIN) 600 MG tablet Take 1 tablet (600 mg) by mouth every 6 hours 40 tablet 0       Problem List:  Patient Active Problem List    Diagnosis Date Noted     Obstructive sleep apnea 12/12/2022     Priority: Medium     The PAT apnea/hypopnea index [pAHI] was 82.8 events per hour.  NIKHIL was 86.9 events per hour. During REM sleep the  pAHI was 72.0.  Of note the pAHIc was 26.2.Sleep Associated Hypoxemia: sustained hypoxemia was present. Mean oxygen saturation was 87%.  Minimum was 61%.  Time with saturation less than 88% was 212.8 minutes.        Excessive daytime sleepiness 08/25/2022     Priority: Medium     Elevated blood pressure reading without diagnosis of hypertension 08/25/2022     Priority: Medium     Hyperlipidemia LDL goal <130 03/27/2011     Priority: Medium     GERD (gastroesophageal reflux disease) 03/24/2011     Priority: Medium     CARDIOVASCULAR SCREENING; LDL GOAL LESS THAN 160 10/31/2010     Priority: Medium     ADD (attention deficit disorder) 07/10/2009     Priority: Medium     Other joint derangement, not elsewhere classified, lower leg 08/08/2005     Priority: Medium        Past Medical/Surgical History:  Past Medical History:   Diagnosis Date     ADD (attention deficit disorder with hyperactivity) 2009     Past Surgical History:   Procedure Laterality Date     SURGICAL HISTORY OF -   age 3    herniorrhaphy       Social History:  Social History     Socioeconomic History     Marital status: Single     Spouse name: Not on file     Number of children: Not on file     Years of education: Not on file     Highest education level: Not on file   Occupational History     Not on file   Tobacco Use     Smoking status: Never     Smokeless tobacco: Current     Tobacco comments:     chewed for a few years   Substance and Sexual Activity     Alcohol use: Yes     Comment: occ     Drug use: No     Sexual activity: Yes   Other Topics Concern      Service No     Blood Transfusions No     Caffeine Concern Yes     Comment: 3-4 cans a day     Occupational Exposure No     Hobby Hazards No     Sleep Concern No     Stress Concern No     Weight Concern No     Special Diet No     Back Care Yes     Comment: chiropractor 1 month entire body     Exercise Yes     Bike Helmet No     Seat Belt Yes     Self-Exams Not Asked     Parent/sibling w/  "CABG, MI or angioplasty before 65F 55M? No   Social History Narrative     Not on file     Social Determinants of Health     Financial Resource Strain: Not on file   Food Insecurity: Not on file   Transportation Needs: Not on file   Physical Activity: Not on file   Stress: Not on file   Social Connections: Not on file   Intimate Partner Violence: Not on file   Housing Stability: Not on file       Family History:  Family History   Problem Relation Age of Onset     Allergies Mother      Diabetes Father      Cerebrovascular Disease Father      Diabetes Paternal Grandfather      Cerebrovascular Disease Paternal Grandfather      Respiratory Maternal Grandmother         emphysema       Review of Systems:   A complete review of systems reviewed by me is negative with the exeption of what has been mentioned in the history of present illness.  In the last TWO WEEKS have you experienced any of the following symptoms?  Fevers: No  Night Sweats: No  Weight Gain: No  Pain at Night: No  Double Vision: No  Changes in Vision: No  Difficulty Breathing through Nose: No  Sore Throat in Morning: Yes  Dry Mouth in the Morning: Yes  Shortness of Breath Lying Flat: No  Shortness of Breath With Activity: No  Awakening with Shortness of Breath: No  Increased Cough: Yes  Heart Racing at Night: No  Swelling in Feet or Legs: No  Diarrhea at Night: No  Heartburn at Night: No  Urinating More than Once at Night: Yes  Losing Control of Urine at Night: No  Joint Pains at Night: No  Headaches in Morning: No  Weakness in Arms or Legs: No  Depressed Mood: No  Anxiety: No     Physical Exam:   Vitals: Ht 1.753 m (5' 9\")   Wt 93 kg (205 lb)   BMI 30.27 kg/m    BMI= Body mass index is 30.27 kg/m .     GENERAL: Healthy, alert and no distress  EYES: Eyes grossly normal to inspection.  No discharge or erythema, or obvious scleral/conjunctival abnormalities.  RESP: No audible wheeze, cough, or visible cyanosis.  No visible retractions or increased work of " breathing.    SKIN: Visible skin clear. No significant rash, abnormal pigmentation or lesions.  NEURO: Cranial nerves grossly intact.  Mentation and speech appropriate for age.  PSYCH: Mentation appears normal, affect normal/bright, judgement and insight intact, normal speech and appearance well-groomed.         Data: All pertinent previous laboratory data reviewed     Recent Labs   Lab Test 08/25/22 0928      POTASSIUM 4.5   CHLORIDE 107   CO2 26   ANIONGAP 5   *   BUN 10   CR 0.78   GIOVANNI 8.8       Recent Labs   Lab Test 08/25/22 0928   WBC 6.0   RBC 5.53   HGB 16.5   HCT 47.3   MCV 86   MCH 29.8   MCHC 34.9   RDW 12.1          No results for input(s): PROTTOTAL, ALBUMIN, BILITOTAL, ALKPHOS, AST, ALT, BILIDIRECT in the last 22312 hours.    TSH (mU/L)   Date Value   08/25/2022 1.85   03/24/2009 4.14       No results found for: UAMP, UBARB, BENZODIAZEUR, UCANN, UCOC, OPIT, UPCP    No results found for: IRONSAT, [id number], AFIA    No results found for: PH, PHARTERIAL, PO2, [id number], SAT, PCO2, HCO3, BASEEXCESS, RUPERT, BEB    @LABRCNTIPR(phv:4,pco2v:4,po2v:4,hco3v:4,xenia:4,o2per:4)@    Echocardiology: No results found for this or any previous visit (from the past 4320 hour(s)).    Chest x-ray: No results found for this or any previous visit from the past 365 days.      Chest CT: No results found for this or any previous visit from the past 365 days.      PFT: Most Recent Breeze Pulmonary Function Testing  No results found    Assessment and Plan:     Problem List Items Addressed This Visit        Respiratory    Obstructive sleep apnea - Primary     Sleep study showed Severe obstructive sleep apnea with an AHI of 82.8. Sleep related hypoxemia was present and significant with 213 minutes with O2 saturation less than 88%.  Additional findings from the sleep study showed abnormal findings including REM AHI was 72 and pAHIc was 26.2      Following discussion with patient a shared decision was made to obtain  a polysomnography with full night PAP titration ordered as an urgent study due to the severity of his RICARDA, sleep-related hypoxemia, and EDS 23/24 (3/3 with driving)    Additionally patient would benefit from titration study due to the presence of central sleep apnea on diagnostic HST, may benefit from bilevel PAP therapy if necessary during titration    Ordered pre PSG COVID test           Relevant Orders    Comprehensive Sleep Study    Asymptomatic COVID-19 Virus (Coronavirus) by PCR     Patient was strongly advised to avoid driving, operating any heavy machinery or other hazardous situations while drowsy or sleepy.  Patient was counseled on the importance of driving while alert, to pull over if drowsy, or nap before getting into the vehicle if sleepy.      Plan is for Deangeloxena MACKEY Menendez to follow up will be determined following titration PSG.     The above note was dictated using voice recognition software. Although reviewed after completion, some word and grammatical error may remain . Please contact the author for any clarifications.    Total time spent reviewing medical records, history and physical examination, review of previous testing and interpretation as well as documentation on this date, 12/12/22: 50 minutes    Kristen Mackenzie MD on 10/20/2022   [facility]   [address], [address]   [address] [place] [address]   Appointments: [phone]      CC: No ref. provider found

## 2022-12-12 NOTE — PATIENT INSTRUCTIONS
MY INFORMATION ON SLEEP APNEA-  Fredi Menendez    DOCTOR : Kristen Mackenzie MD  SLEEP CENTER :      MY CONTACT NUMBER:    [facility]  [phone]  [facility]      For general sleep health questions:   [email or web address]    For tips about PAP and COVID-19:  https://www.thoracic.org/patients/patient-resources/resources/covid-19-and-home-pap-therapy.pdf    For general info about COVID-19 including vaccines:  [email or web address]      Continue PAP therapy every night, for all hours that you are sleeping (including naps.)  As always, try to get at least 8 hours of sleep or more each day, keep a regular sleep schedule, and avoid sleep deprivation. Avoid alcohol.  Reasons that you might need a change to your pressure therapy would be weight gain or loss, waking having inadvertently removed your PAP overnight, having previously felt refreshed by sleep with CPAP use and now waking un-refreshed, and return of daytime sleepiness. Also, the development of new medical problems  (such as heart failure, stroke, medications such as narcotics) can sometimes affect breathing at night and change your PAP therapy needs.  Please bring PAP with you if you are hospitalized.  If anticipating surgery be sure to discuss with your surgeon that you have sleep apnea and use PAP therapy.    Maintain your equipment as recommended which includes routine cleaning and replacement of supplies.      Call DME for any questions regarding supplies or maintenance.    [facility], [facility] [phone]    Do not drive on engage in potentially dangerous activities if feeling sleepy.  Please follow up in sleep clinic again in 3 months.      Tips for your PAP use-    Mask fitting tips  Mask fitting exercise:    To improve your mask seal and your mobility at night, put mask on and secure in place.  Lie down in bed with full pressure and  roll to one side, adjust headgear while in that position to eliminate any leaks. Repeat process rolling to other side.     The mask seal does not have to be perfect:   CPAP machines are designed to make up for small leaks. However, you will not tolerate leaks blowing in your eyes so you will need to adjust.   Any leak should only be near or at the bottom of the mask.  We expect your mask to leak slightly at night.    Do not over-tighten the headgear straps, tighter IS NOT better, we expect minimal leak.    First try re-positioning the mask or headgear before tightening the headgear straps.  Mask leaks are expected due to changing sleeping positions. Try pulling the mask away from your skin allowing the cushion to re-inflate will minimize the leak.  If you struggle for a good fit, try turning the CPAP off and then readjust the mask by pulling it away from your face and then turning back on the CPAP.        Humidifier tips  Humidifiers can be adjusted to increase or decrease the amount of moisture according to your comfort level. You may need to adjust this frequently at first, but then might only change it with seasonal weather changes.     Try INCREASING the humidity if:  You experience a dry, irritated nasal passage or throat.  You have a runny, drippy nose or sneezing fits after using CPAP.  You experience nasal congestion during or after CPAP use.    Try DECREASING the humidity if:  You have excessive condensation or  rain out  in the tubing or mask.  Otherwise keep the tubing warm during the night by running it underneath the blankets or pillow.      Clinic visit after initial PAP set-up   Bring your equipment with you to your 5-8 week follow up clinic visit.  We will be extracting your data from the machine if not available from the cloud based [email or web address].        Travel  Always take your equipment with you when you travel.  If you fly with your equipment bring it on with you as a carry on.  Medical equipment does  not count as a carry on.    If you travel international the machines take 110-240v.  The only adapter needed is the adapter that will fit into the receptacle (outlet).    You may also want to bring an extension cord as many hotel rooms have limited outlets at the bedside.  Do not travel with water in your humidifier chamber.     Cleaning and Maintenance Guidelines    Equipment Frequency Cleaning Method   Mask First Day    Daily      Weekly Soak mask in hot soapy water for 30 minutes, rinse and air dry.  Wipe nasal cushion with a hot soapy (Ivory, baby shampoo) cloth and rinse.  Baby wipes may also be used.  Do not use anti-bacterial soaps,Concepcion  liquid soap, rubbing alcohol, bleach or ammonia.  Wash frame in hot soapy water (Ivory, baby shampoo) rinse and let air dry   Headgear Biweekly Wash in hot soapy water, rinse and air dry   Reusable Gray Filter Weekly Wash in hot soapy water, rinse, put in towel squeeze moisture out, let air dry   Disposable White Filter Check Weekly Replace when brown or gray in color; at least every 2 to 3 months   Humidifier Chamber Daily    Weekly Empty distilled water from humidifier and let air dry    Hand wash in hot soapy water, rinse and air dry   Tubing Weekly Wash in hot soapy water, rinse and let air dry   Mask, Tubing and Humidifier Chamber As needed Disinfect: Soak in 1 part distilled white vinegar to 3 parts hot water for 30 minutes, rinse well and air dry  Not the material headgear        MASK AND SUPPLY REORDERING and EQUIPMENT NEEDS through your DME and per your insurance  Reminder: Most insurance companies will allow for a new mask, headgear, tubing, and reusable gray filter every six months.  Disposable white ultra-fine filters are covered monthly.      HOME AND SAFETY INSTRUCTIONS  Do not use frayed or cracked electrical cords, multi plug adaptors, or switched receptacles  Do not immerse electrical equipment into water  Assure that electrical cords do not become a tripping  hazard

## 2022-12-12 NOTE — ASSESSMENT & PLAN NOTE
Sleep study showed Severe obstructive sleep apnea with an AHI of 82.8. Sleep related hypoxemia was present and significant with 213 minutes with O2 saturation less than 88%.  Additional findings from the sleep study showed abnormal findings including REM AHI was 72 and pAHIc was 26.2      Following discussion with patient a shared decision was made to obtain a polysomnography with full night PAP titration ordered as an urgent study due to the severity of his RICARDA, sleep-related hypoxemia, and EDS 23/24 (3/3 with driving)    Additionally patient would benefit from titration study due to the presence of central sleep apnea on diagnostic HST, may benefit from bilevel PAP therapy if necessary during titration    Ordered pre PSG COVID test

## 2023-02-14 ENCOUNTER — LAB (OUTPATIENT)
Dept: LAB | Facility: CLINIC | Age: 38
End: 2023-02-14
Payer: COMMERCIAL

## 2023-02-14 DIAGNOSIS — G47.33 OBSTRUCTIVE SLEEP APNEA: ICD-10-CM

## 2023-02-14 LAB — SARS-COV-2 RNA RESP QL NAA+PROBE: NEGATIVE

## 2023-02-14 PROCEDURE — U0003 INFECTIOUS AGENT DETECTION BY NUCLEIC ACID (DNA OR RNA); SEVERE ACUTE RESPIRATORY SYNDROME CORONAVIRUS 2 (SARS-COV-2) (CORONAVIRUS DISEASE [COVID-19]), AMPLIFIED PROBE TECHNIQUE, MAKING USE OF HIGH THROUGHPUT TECHNOLOGIES AS DESCRIBED BY CMS-2020-01-R: HCPCS

## 2023-02-14 PROCEDURE — U0005 INFEC AGEN DETEC AMPLI PROBE: HCPCS

## 2023-02-17 ENCOUNTER — THERAPY VISIT (OUTPATIENT)
Dept: SLEEP MEDICINE | Facility: CLINIC | Age: 38
End: 2023-02-17
Payer: COMMERCIAL

## 2023-02-17 DIAGNOSIS — G47.33 OBSTRUCTIVE SLEEP APNEA: ICD-10-CM

## 2023-02-17 LAB — SLPCOMP: NORMAL

## 2023-02-17 PROCEDURE — 95811 POLYSOM 6/>YRS CPAP 4/> PARM: CPT | Performed by: STUDENT IN AN ORGANIZED HEALTH CARE EDUCATION/TRAINING PROGRAM

## 2023-02-17 ASSESSMENT — SLEEP AND FATIGUE QUESTIONNAIRES
HOW LIKELY ARE YOU TO NOD OFF OR FALL ASLEEP WHEN YOU ARE A PASSENGER IN A CAR FOR AN HOUR WITHOUT A BREAK: HIGH CHANCE OF DOZING
HOW LIKELY ARE YOU TO NOD OFF OR FALL ASLEEP WHILE LYING DOWN TO REST IN THE AFTERNOON WHEN CIRCUMSTANCES PERMIT: HIGH CHANCE OF DOZING
HOW LIKELY ARE YOU TO NOD OFF OR FALL ASLEEP WHILE SITTING AND TALKING TO SOMEONE: SLIGHT CHANCE OF DOZING
HOW LIKELY ARE YOU TO NOD OFF OR FALL ASLEEP IN A CAR, WHILE STOPPED FOR A FEW MINUTES IN TRAFFIC: HIGH CHANCE OF DOZING
HOW LIKELY ARE YOU TO NOD OFF OR FALL ASLEEP WHILE SITTING QUIETLY AFTER LUNCH WITHOUT ALCOHOL: HIGH CHANCE OF DOZING
HOW LIKELY ARE YOU TO NOD OFF OR FALL ASLEEP WHILE SITTING AND READING: HIGH CHANCE OF DOZING
HOW LIKELY ARE YOU TO NOD OFF OR FALL ASLEEP WHILE SITTING INACTIVE IN A PUBLIC PLACE: MODERATE CHANCE OF DOZING
HOW LIKELY ARE YOU TO NOD OFF OR FALL ASLEEP WHILE WATCHING TV: MODERATE CHANCE OF DOZING

## 2023-02-18 NOTE — PATIENT INSTRUCTIONS
[facility]    1. Your sleep study will be reviewed by a sleep physician within the next few days.     2. Please follow up in the sleep clinic as scheduled, or, make an appointment with your sleep provider to be seen within two weeks to discuss the results of the sleep study.    3. If you have any questions or problems with your treatment plan, please contact your sleep clinic provider at [phone] to further manage your condition.    4. Please review your attached medication list, and, at your follow-up appointment advise your sleep clinic provider about any changes.    5. Go to [email or web address] for more information about your sleep problems.    Cole Spain, KANIKAGT  February 18, 2023

## 2023-02-18 NOTE — NURSING NOTE
Completed a all night titration PSG per provider order.    Preliminary AHI 82.8.  A final therapeutic PAP pressure was achieved.    Supine REM was seen on therapeutic pressure.    Patient reports feeling refreshed in AM.

## 2023-03-03 ENCOUNTER — VIRTUAL VISIT (OUTPATIENT)
Dept: SLEEP MEDICINE | Facility: CLINIC | Age: 38
End: 2023-03-03
Payer: COMMERCIAL

## 2023-03-03 VITALS — BODY MASS INDEX: 30.36 KG/M2 | HEIGHT: 69 IN | WEIGHT: 205 LBS

## 2023-03-03 DIAGNOSIS — G47.33 OBSTRUCTIVE SLEEP APNEA: Primary | ICD-10-CM

## 2023-03-03 PROCEDURE — 99214 OFFICE O/P EST MOD 30 MIN: CPT | Mod: VID | Performed by: STUDENT IN AN ORGANIZED HEALTH CARE EDUCATION/TRAINING PROGRAM

## 2023-03-03 ASSESSMENT — SLEEP AND FATIGUE QUESTIONNAIRES
HOW LIKELY ARE YOU TO NOD OFF OR FALL ASLEEP WHILE LYING DOWN TO REST IN THE AFTERNOON WHEN CIRCUMSTANCES PERMIT: HIGH CHANCE OF DOZING
HOW LIKELY ARE YOU TO NOD OFF OR FALL ASLEEP WHILE SITTING AND TALKING TO SOMEONE: MODERATE CHANCE OF DOZING
HOW LIKELY ARE YOU TO NOD OFF OR FALL ASLEEP WHILE SITTING INACTIVE IN A PUBLIC PLACE: MODERATE CHANCE OF DOZING
HOW LIKELY ARE YOU TO NOD OFF OR FALL ASLEEP WHILE WATCHING TV: HIGH CHANCE OF DOZING
HOW LIKELY ARE YOU TO NOD OFF OR FALL ASLEEP WHEN YOU ARE A PASSENGER IN A CAR FOR AN HOUR WITHOUT A BREAK: HIGH CHANCE OF DOZING
HOW LIKELY ARE YOU TO NOD OFF OR FALL ASLEEP IN A CAR, WHILE STOPPED FOR A FEW MINUTES IN TRAFFIC: HIGH CHANCE OF DOZING
HOW LIKELY ARE YOU TO NOD OFF OR FALL ASLEEP WHILE SITTING AND READING: HIGH CHANCE OF DOZING
HOW LIKELY ARE YOU TO NOD OFF OR FALL ASLEEP WHILE SITTING QUIETLY AFTER LUNCH WITHOUT ALCOHOL: HIGH CHANCE OF DOZING

## 2023-03-03 NOTE — PATIENT INSTRUCTIONS
MY INFORMATION ON SLEEP APNEA-  Fredi Menendez    DOCTOR : Kristen Mackenzie MD  SLEEP CENTER :      MY CONTACT NUMBER:    [facility]  [phone]  [facility]    I have placed an order for a new CPAP machine. The durable medical equipment(DME) company should reach out to you within a week or two.   If you do not hear from them, this is their contact information:     [facility] [place]  Address: [facility]  [address], [address] [place] [address]  Phone: [phone]  Fax: [phone]    Kristen Mackenzie MD     For general sleep health questions:   http://[email or web address]    For tips about PAP and COVID-19:  https://www.thoracic.org/patients/patient-resources/resources/covid-19-and-home-pap-therapy.pdf    For general info about COVID-19 including vaccines:  [email or web address]      Continue PAP therapy every night, for all hours that you are sleeping (including naps.)  As always, try to get at least 8 hours of sleep or more each day, keep a regular sleep schedule, and avoid sleep deprivation. Avoid alcohol.  Reasons that you might need a change to your pressure therapy would be weight gain or loss, waking having inadvertently removed your PAP overnight, having previously felt refreshed by sleep with CPAP use and now waking un-refreshed, and return of daytime sleepiness. Also, the development of new medical problems  (such as heart failure, stroke, medications such as narcotics) can sometimes affect breathing at night and change your PAP therapy needs.  Please bring PAP with you if you are hospitalized.  If anticipating surgery be sure to discuss with your surgeon that you have sleep apnea and use PAP therapy.    Maintain your equipment as recommended which includes routine cleaning and replacement of supplies.      Call DME for any questions regarding supplies or maintenance.     [place] [facility], [facility] [phone]    Do not drive on engage in potentially dangerous activities if feeling sleepy.  Please follow up in sleep clinic again in 3 months.        Tips for your PAP use-    Mask fitting tips  Mask fitting exercise:    To improve your mask seal and your mobility at night, put mask on and secure in place.  Lie down in bed with full pressure and roll to one side, adjust headgear while in that position to eliminate any leaks. Repeat process rolling to other side.     The mask seal does not have to be perfect:   CPAP machines are designed to make up for small leaks. However, you will not tolerate leaks blowing in your eyes so you will need to adjust.   Any leak should only be near or at the bottom of the mask.  We expect your mask to leak slightly at night.    Do not over-tighten the headgear straps, tighter IS NOT better, we expect minimal leak.    First try re-positioning the mask or headgear before tightening the headgear straps.  Mask leaks are expected due to changing sleeping positions. Try pulling the mask away from your skin allowing the cushion to re-inflate will minimize the leak.  If you struggle for a good fit, try turning the CPAP off and then readjust the mask by pulling it away from your face and then turning back on the CPAP.        Humidifier tips  Humidifiers can be adjusted to increase or decrease the amount of moisture according to your comfort level. You may need to adjust this frequently at first, but then might only change it with seasonal weather changes.     Try INCREASING the humidity if:  You experience a dry, irritated nasal passage or throat.  You have a runny, drippy nose or sneezing fits after using CPAP.  You experience nasal congestion during or after CPAP use.    Try DECREASING the humidity if:  You have excessive condensation or  rain out  in the tubing or mask.  Otherwise keep the tubing warm during the night  by running it underneath the blankets or pillow.      Clinic visit after initial PAP set-up   Bring your equipment with you to your 5-8 week follow up clinic visit.  We will be extracting your data from the machine if not available from the cloud based modem.        Travel  Always take your equipment with you when you travel.  If you fly with your equipment bring it on with you as a carry on.  Medical equipment does not count as a carry on.    If you travel international the machines take 110-240v.  The only adapter needed is the adapter that will fit into the receptacle (outlet).    You may also want to bring an extension cord as many hot rooms have limited outlets at the bedside.  Do not travel with water in your humidifier chamber.     Cleaning and Maintenance Guidelines    Equipment Frequency Cleaning Method   Mask First Day    Daily      Weekly Soak mask in hot soapy water for 30 minutes, rinse and air dry.  Wipe nasal cushion with a hot soapy (Ivory, baby shampoo) cloth and rinse.  Baby wipes may also be used.  Do not use anti-bacterial soaps,Concepcion  liquid soap, rubbing alcohol, bleach or ammonia.  Wash frame in hot soapy water (Ivory, baby shampoo) rinse and let air dry   Headgear Biweekly Wash in hot soapy water, rinse and air dry   Reusable Gray Filter Weekly Wash in hot soapy water, rinse, put in towel squeeze moisture out, let air dry   Disposable White Filter Check Weekly Replace when brown or gray in color; at least every 2 to 3 months   Humidifier Chamber Daily    Weekly Empty distilled water from humidifier and let air dry    Hand wash in hot soapy water, rinse and air dry   Tubing Weekly Wash in hot soapy water, rinse and let air dry   Mask, Tubing and Humidifier Chamber As needed Disinfect: Soak in 1 part distilled white vinegar to 3 parts hot water for 30 minutes, rinse well and air dry  Not the material headgear        MASK AND SUPPLY REORDERING and EQUIPMENT NEEDS through your DME and per your  insurance  Reminder: Most insurance companies will allow for a new mask, headgear, tubing, and reusable gray filter every six months.  Disposable white ultra-fine filters are covered monthly.      HOME AND SAFETY INSTRUCTIONS  Do not use frayed or cracked electrical cords, multi plug adaptors, or switched receptacles  Do not immerse electrical equipment into water  Assure that electrical cords do not become a tripping hazard

## 2023-03-03 NOTE — ASSESSMENT & PLAN NOTE
Sleep study showed Severe obstructive sleep apnea. Sleep related hypoxemia was present.  Titration study found an effective pressure of  Cm H2O2    Following discussion with patient a shared decision was made to begin therapy with auto-CPAP at 5-10 cmH2O.

## 2023-03-03 NOTE — PROGRESS NOTES
Video-Visit Details  Type of service:  Video Visit    Video Start Time (time video started): 4:07 PM    Video End Time (time video stopped): 4:31 PM    Originating Location (pt. Location): Home    Distant Location (provider location):  On-site    Mode of Communication:  Video Conference via [facility]  Sleep Study Follow-Up Visit:    Date on this visit: 3/3/2023    Primary Physician: System, Provider Not In     History of present illness:  Fredi Menendez is a 38 year old adult patient with severe RICARDA with sleep associated hypoxemia who comes in today for follow-up of His sleep study done on 2/17/2023 at the [facility] for Titration PSG for severe sleep apnea with sleep associated hypoxemia.    Sleep study demonstrated effective CPAP pressure of 9 cm H2O to eliminate obstructive events and resolved sleep associated hypoxemia.  Residual AHI was less than 5 at this pressure.    These findings were reviewed with patient.     SCALES:  EPWORTH SLEEPINESS SCALE    [place] Sleepiness Scale ( RADHA Stoner  [id number]<br>ESS - [place]/English - Final version - 21 Nov 07 - [facility] [place].) 3/3/2023   Sitting and reading High chance of dozing   Watching TV High chance of dozing   Sitting, inactive in a public place (e.g. a theatre or a meeting) Moderate chance of dozing   As a passenger in a car for an hour without a break High chance of dozing   Lying down to rest in the afternoon when circumstances permit High chance of dozing   Sitting and talking to someone Moderate chance of dozing   Sitting quietly after a lunch without alcohol High chance of dozing   In a car, while stopped for a few minutes in traffic High chance of dozing   [place] Score (MC) 22   [place] Score (Sleep) 22       INSOMNIA SEVERITY INDEX (BLANK)    Insomnia Severity Index (BLANK) 3/3/2023   Difficulty falling asleep 0   Difficulty staying asleep 3   Problems waking up too early 3   How  SATISFIED/DISSATISFIED are you with your CURRENT sleep pattern? 4   How NOTICEABLE to others do you think your sleep problem is in terms of impairing the quality of your life? 4   How WORRIED/DISTRESSED are you about your current sleep problem? 4   To what extent do you consider your sleep problem to INTERFERE with your daily functioning (e.g. daytime fatigue, mood, ability to function at work/daily chores, concentration, memory, mood, etc.) CURRENTLY? 3   BLANK Total Score 21     Guidelines for Scoring/Interpretation:  Total score categories:  0-7 = No clinically significant insomnia   8-14 = Subthreshold insomnia   15-21 = Clinical insomnia (moderate severity)  22-28 = Clinical insomnia (severe)  Used via courtesy of www.[employer].va.gov with permission from Emile Wolf PhD., [facility]      Allergies:    Allergies   Allergen Reactions     Nasacort [Corticosteroids]      Septra [Bactrim]        Medications:    Current Outpatient Medications   Medication Sig Dispense Refill     ibuprofen (ADVIL/MOTRIN) 600 MG tablet Take 1 tablet (600 mg) by mouth every 6 hours 40 tablet 0       Problem List:  Patient Active Problem List    Diagnosis Date Noted     Obstructive sleep apnea 12/12/2022     Priority: Medium     The PAT apnea/hypopnea index [pAHI] was 82.8 events per hour.  NIKHIL was 86.9 events per hour. During REM sleep the pAHI was 72.0.  Of note the pAHIc was 26.2.Sleep Associated Hypoxemia: sustained hypoxemia was present. Mean oxygen saturation was 87%.  Minimum was 61%.  Time with saturation less than 88% was 212.8 minutes.     2/17/2023 [place] Titration Sleep Study (205.0 lbs) - Treatment was titrated to a pressure of CPAP 9 with an AHI of 0.2. Time Spent in REM supine at this pressure was 76.0.       Excessive daytime sleepiness 08/25/2022     Priority: Medium     Elevated blood pressure reading without diagnosis of hypertension 08/25/2022     Priority: Medium     Hyperlipidemia LDL goal <130 03/27/2011      Priority: Medium     GERD (gastroesophageal reflux disease) 03/24/2011     Priority: Medium     CARDIOVASCULAR SCREENING; LDL GOAL LESS THAN 160 10/31/2010     Priority: Medium     ADD (attention deficit disorder) 07/10/2009     Priority: Medium     Other joint derangement, not elsewhere classified, lower leg 08/08/2005     Priority: Medium        Past Medical/Surgical History:  Past Medical History:   Diagnosis Date     ADD (attention deficit disorder with hyperactivity) 2009     Past Surgical History:   Procedure Laterality Date     SURGICAL HISTORY OF -   age 3    herniorrhaphy       Social History:  Social History     Socioeconomic History     Marital status: Single     Spouse name: Not on file     Number of children: Not on file     Years of education: Not on file     Highest education level: Not on file   Occupational History     Not on file   Tobacco Use     Smoking status: Never     Smokeless tobacco: Current     Tobacco comments:     chewed for a few years   Substance and Sexual Activity     Alcohol use: Yes     Comment: occ     Drug use: No     Sexual activity: Yes   Other Topics Concern      Service No     Blood Transfusions No     Caffeine Concern Yes     Comment: 3-4 cans a day     Occupational Exposure No     Hobby Hazards No     Sleep Concern No     Stress Concern No     Weight Concern No     Special Diet No     Back Care Yes     Comment: chiropractor 1 month entire body     Exercise Yes     Bike Helmet No     Seat Belt Yes     Self-Exams Not Asked     Parent/sibling w/ CABG, MI or angioplasty before 65F 55M? No   Social History Narrative     Not on file     Social Determinants of Health     Financial Resource Strain: Not on file   Food Insecurity: Not on file   Transportation Needs: Not on file   Physical Activity: Not on file   Stress: Not on file   Social Connections: Not on file   Intimate Partner Violence: Not on file   Housing Stability: Not on file       Family History:  Family  "History   Problem Relation Age of Onset     Allergies Mother      Diabetes Father      Cerebrovascular Disease Father      Diabetes Paternal Grandfather      Cerebrovascular Disease Paternal Grandfather      Respiratory Maternal Grandmother         emphysema       Review of systems  A complete review of systems reviewed by me is negative with the exeption of what has been mentioned in the history of present illness.    Physical Examination:  Vitals: Ht 1.753 m (5' 9\")   Wt 93 kg (205 lb)   BMI 30.27 kg/m    BMI= Body mass index is 30.27 kg/m .  GENERAL: Healthy, alert and no distress  EYES: Eyes grossly normal to inspection.  No discharge or erythema, or obvious scleral/conjunctival abnormalities.  RESP: No audible wheeze, cough, or visible cyanosis.  No visible retractions or increased work of breathing.    SKIN: Visible skin clear. No significant rash, abnormal pigmentation or lesions.  NEURO: Cranial nerves grossly intact.  Mentation and speech appropriate for age.  PSYCH: Mentation appears normal, affect normal/bright, judgement and insight intact, normal speech and appearance well-groomed.           Other tests/labs:   I have reviewed the labs and personally reviewed the imaging below and made my comment in the assessment and plan.    Assessment and Plan:  Problem List Items Addressed This Visit        Respiratory    Obstructive sleep apnea - Primary     Sleep study showed Severe obstructive sleep apnea. Sleep related hypoxemia was present.  Titration study found an effective pressure of  Cm H2O2    Following discussion with patient a shared decision was made to begin therapy with auto-CPAP at 5-10 cmH2O.           Relevant Orders    COMPREHENSIVE DME (Completed)       Patient was strongly advised to avoid driving, operating any heavy machinery or other hazardous situations while drowsy or sleepy.  Patient was counseled on the importance of driving while alert, to pull over if drowsy, or nap before getting " into the vehicle if sleepy.      Plan is for Fredi Menendez to follow up in about 3 month(s).     The above note was dictated using voice recognition software. Although reviewed after completion, some word and grammatical error may remain . Please contact the author for any clarifications.    Total time spent reviewing medical records, history and physical examination, review of previous testing and interpretation as well as documentation on this date, 03/03/23: 30 minutes    Kristen Mackenzie MD on 3/3/2023   [facility]   Floor 1, [address]   [address] [place], [place] [address]. [place] [address]   Appointments: [phone]    CC: No ref. provider found

## 2023-03-03 NOTE — PROCEDURES
" SLEEP STUDY INTERPRETATION  TITRATION STUDY      Patient: STEFAN CHAUHAN  YOB: 1985  Study Date: 2/17/2023  MRN: [id number]  Referring Provider: Self  Ordering Provider: Kristen Mackenzie MD    Indications for Polysomnography: The patient is a 37 year old Male who is 5' 9\" and weighs 205.0 lbs. His BMI is 30.4, Thompson sleepiness scale 23/24 and neck circumference is 45 cm. Relevant medical history includes severe obstructive sleep apnea and sleep associated hypoxemia. A polysomnogram with PAP titration was performed to correct for sleep apnea/hypoxemia.    Polysomnogram Data: A full night polysomnogram recorded the standard physiologic parameters including EEG, EOG, EMG, ECG, nasal and oral airflow. Respiratory parameters of chest and abdominal movements were recorded with respiratory inductance plethysmography. Oxygen saturation was recorded by pulse oximetry. Hypopnea scoring rule used: 1B 4%.    Treatment PSG  Sleep Architecture: All stages of sleep were observed. Sleep fragmentation improved with PAP therapy.   The total recording time of the polysomnogram was 408.5 minutes. The total sleep time was 336.0 minutes. Sleep latency was decreased at 1.5 minutes without the use of a sleep aid. REM latency was 118.5 minutes. Arousal index was normal at 13.8 arousals per hour. Sleep efficiency was normal at 82.3%. Wake after sleep onset was 71.0 minutes. The patient spent 9.1% of total sleep time in Stage N1, 28.3% in Stage N2, 40.0% in Stage N3, and 22.6% in REM. Time in REM supine was 76.0 minutes.    Respiration: PAP titration was optimal as residual AHI was < 5 and sleep hypoxemia resolved. Additionally, the patient was observed in REM supine sleep at the final pressure without RICARDA or hypoxemia.     The patient was titrated at pressures ranging from CPAP 5 cmH2O up to CPAP 9 cmH2O. The final pressure achieved was CPAP 9 cmH2O with a residual AHI of 0.2 events per hour. Time in REM supine " on final pressure was 76.0 minutes.     This titration was considered:  - Optimal (residual AHI < 5 events per hour and including REM supine sleep at final pressure).     Snoring - was reported as absent on treatment.    Respiratory rate and pattern - was notable for normal respiratory rate and pattern.    Sustained Sleep Associated Hypoventilation - Transcutaneous carbon dioxide monitoring was not used.    Sleep Associated Hypoxemia - (Greater than 5 minutes O2 sat at or below 88%) was not present. Baseline oxygen saturation was 94.9%. Lowest oxygen saturation was 85.0%. Time spent less than or equal to 88% was 1.6 minutes. Time spent less than or equal to 89% was 2.9 minutes.    Movement Activity: PLMs were observed but not associated with arousals. No other abnormal behaviors were observed.     Periodic Limb Activity - There were 130 PLMs during the entire study. The PLM index was 23.2 movements per hour. The PLM Arousal Index was 0.2 per hour.    REM EMG Activity - Excessive transient/sustained muscle activity was not present.    Nocturnal Behavior - Abnormal sleep related behaviors were not noted during/arising out of NREM / REM sleep.    Bruxism - None apparent.    Cardiac Summary: Normal Sinus Rhythm.   The average pulse rate was 63.4 bpm. The minimum pulse rate was 48.0 bpm while the maximum pulse rate was 91.0 bpm. Arrhythmias were not noted.              Assessment:     All stages of sleep were observed. Sleep fragmentation improved with PAP therapy    PAP titration was optimal as residual AHI was < 5 and sleep hypoxemia resolved. Additionally, the patient was observed in REM supine sleep at the final pressure without RICARDA or hypoxemia.     PLMs were observed but not associated with arousals. No other abnormal behaviors were observed    Normal Sinus Rhythm    Recommendations:    Treatment of RICARDA with CPAP at 9 cmH2O or Auto?titrating PAP therapy with a range of 5 cmH2O to 10 cmH2O. Recommend clinical follow  up with sleep management team, for coaching and review of effectiveness and compliance measures.    Advice regarding the risks of drowsy driving.    Suggest optimizing sleep schedule and avoiding sleep deprivation.    Weight management (if BMI > 30).    Diagnostic Codes:   Obstructive Sleep Apnea G47.33  Sleep Hypoxemia G47.36     2/17/2023 [place] Titration Sleep Study (205.0 lbs) - Treatment was titrated to a pressure of CPAP 9 with an AHI of 0.2. Time Spent in REM supine at this pressure was 76.0.    Attending MD: PSG was personally reviewed in detail by me.  The above interpretation reflects my assessment and recommendations.   _____________________________________   Electronically Signed By: Kristen Mackenzie MD 03/01/2023

## 2023-03-03 NOTE — NURSING NOTE
Is the patient currently in the state of [place]? YES    Visit mode:VIDEO    If the visit is dropped, the patient can be reconnected by: VIDEO VISIT: Text to cell phone: [phone]    Will anyone else be joining the visit? NO      How would you like to obtain your AVS? MyChart    Are changes needed to the allergy or medication list? NO    Reason for visit: sleep study follow up

## 2023-03-21 ENCOUNTER — DOCUMENTATION ONLY (OUTPATIENT)
Dept: SLEEP MEDICINE | Facility: CLINIC | Age: 38
End: 2023-03-21
Payer: COMMERCIAL

## 2023-03-21 DIAGNOSIS — G47.33 OSA (OBSTRUCTIVE SLEEP APNEA): Primary | ICD-10-CM

## 2023-03-21 DIAGNOSIS — G47.36 SLEEP RELATED HYPOVENTILATION IN CONDITIONS CLASSIFIED ELSEWHERE: ICD-10-CM

## 2023-03-21 NOTE — PROGRESS NOTES
Patient was offered choice of vendor and chose [facility].  Patient Fredi Menendez was set up at [place]  on March 21, 2023. Patient received a Resmed Airsense 11 Pressures were set at 5-10 cm H2O.   Patient s ramp is 5 cm H2O for Auto and FLEX/EPR is 2.  Patient received a Resmed Mask name: AIRFIT  N20  Nasal mask size Large, heated tubing and heated humidifier.  Patient has the following compliance requirements: none   Alesia Lopez

## 2023-03-24 ENCOUNTER — DOCUMENTATION ONLY (OUTPATIENT)
Dept: SLEEP MEDICINE | Facility: CLINIC | Age: 38
End: 2023-03-24
Payer: COMMERCIAL

## 2023-03-24 NOTE — PROGRESS NOTES
3 day Sleep therapy management telephone visit    Diagnostic AHI:  82.8 watch PAT    Confirmed with patient at time of call- N/A Patient is still interested in STM service       Message left for patient to return call.        Objective data     Order Settings for PAP  CPAP min     CPAP max              Device settings from machine CPAP min 5.0     CPAP max 10.0           EPR Setting ONE    RESMED soft response  OFF     Assessment: Nightly usage over four hours      Action plan: Patient to have 14 day STM visit. Patient has a follow up visit scheduled:   no    Replacement device: No  STM ordered by provider: Yes     Total time spent on accessing and  interpreting remote patient PAP therapy data  10 minutes    Total time spent counseling, coaching  and reviewing PAP therapy data with patient  1 minutes    [id number] no

## 2023-04-05 ENCOUNTER — DOCUMENTATION ONLY (OUTPATIENT)
Dept: SLEEP MEDICINE | Facility: CLINIC | Age: 38
End: 2023-04-05
Payer: COMMERCIAL

## 2023-04-05 NOTE — PROGRESS NOTES
14  DAY STM VISIT    Diagnostic AHI:  82.8 events per hour watch PAT    Message left for patient to return call     Assessment: Pt meeting objective benchmarks.      Action plan: waiting for patient to return call.  and pt to have 30 day STM visit.      Device type: Auto-CPAP    PAP settings:  CPAP MIN CPAP MAX 95TH % PRESSURE EPR RESMED SOFT RESPONSE SETTING   5.0 cm  H20 10.0 cm  H20 9.9 cm  H20  ONE OFF     Mask type:  Nasal Mask    Objective measures: 14 day rolling measures   COMPLIANCE LEAK AHI AVERAGE USE IN MINUTES   100 % 5.6 0.42 344   GOAL >70% GOAL < 24 LPM GOAL <5 GOAL >240          Total time spent on accessing and interpreting remote patient PAP therapy data  10 minutes    Total time spent counseling, coaching  and reviewing PAP therapy data with patient  1 minute    [id number]  [id number]  no (3 day STM)

## 2023-07-12 ENCOUNTER — OFFICE VISIT (OUTPATIENT)
Dept: PODIATRY | Facility: CLINIC | Age: 38
End: 2023-07-12
Payer: COMMERCIAL

## 2023-07-12 VITALS
HEIGHT: 69 IN | BODY MASS INDEX: 30.36 KG/M2 | WEIGHT: 205 LBS | HEART RATE: 57 BPM | SYSTOLIC BLOOD PRESSURE: 141 MMHG | DIASTOLIC BLOOD PRESSURE: 92 MMHG

## 2023-07-12 DIAGNOSIS — M72.2 PLANTAR FASCIITIS, LEFT: ICD-10-CM

## 2023-07-12 DIAGNOSIS — M72.2 PLANTAR FASCIITIS, RIGHT: Primary | ICD-10-CM

## 2023-07-12 PROCEDURE — 99203 OFFICE O/P NEW LOW 30 MIN: CPT | Performed by: PODIATRIST

## 2023-07-12 RX ORDER — MELOXICAM 7.5 MG/1
7.5 TABLET ORAL DAILY
Qty: 30 TABLET | Refills: 1 | Status: SHIPPED | OUTPATIENT
Start: 2023-07-12

## 2023-07-12 ASSESSMENT — PAIN SCALES - GENERAL: PAINLEVEL: EXTREME PAIN (9)

## 2023-07-12 NOTE — NURSING NOTE
"Chief Complaint   Patient presents with     Consult     Pain to bilateral feet       Initial BP (!) 141/92   Pulse 57   Ht 1.753 m (5' 9\")   Wt 93 kg (205 lb)   BMI 30.27 kg/m   Estimated body mass index is 30.27 kg/m  as calculated from the following:    Height as of this encounter: 1.753 m (5' 9\").    Weight as of this encounter: 93 kg (205 lb).  Medications and allergies reviewed.      Nisha MANDEL MA    "

## 2023-07-12 NOTE — PROGRESS NOTES
"PATIENT HISTORY:  Fredi Menendez is a 38 year old adult who presents to clinic as a self referral with a chief complaint of severe pain on the bottom of both feet.  The patient is seen by themselves.  The patient relates the pain is primarily located around the heels.  Reports insidious onset without acute precipitating event. that has been going on for several  year(s).  The patient has previously tried different shoes with little relief.  Any previous notes and studies that pertain to the patient's condition were reviewed.    Pertinent medical, surgical and family history was reviewed in the [facility] chart.    Past Medical History:   Past Medical History:   Diagnosis Date     ADD (attention deficit disorder with hyperactivity) 2009       Medications:   Current Outpatient Medications:      meloxicam (MOBIC) 7.5 MG tablet, Take 1 tablet (7.5 mg) by mouth daily, Disp: 30 tablet, Rfl: 1     Allergies:    Allergies   Allergen Reactions     Nasacort [Corticosteroids]      Septra [Bactrim]        Vitals: BP (!) 141/92   Pulse 57   Ht 1.753 m (5' 9\")   Wt 93 kg (205 lb)   BMI 30.27 kg/m    BMI= Body mass index is 30.27 kg/m .    LOWER EXTREMITY PHYSICAL EXAM    Dermatologic: Skin is intact to right and left lower extremity without significant lesions, rash or abrasion.        Vascular: DP & PT pulses are intact & regular on the right and left.   CFT and skin temperature is normal to the right and left lower extremity.     Neurologic: Lower extremity sensation is intact to light touch.  No evidence of weakness in the right and left lower extremity.        Musculoskeletal: Patient is ambulatory without assistive device or brace.  No gross ankle deformity noted.  No foot or ankle joint effusion is noted.  Noted pain on palpation on the plantar aspect of the right and left heel near the insertion point of the plantar fascia.  No surrounding erythema or edema noted.  Noted tight gastroc complex on the right and left.     "     ASSESSMENT / PLAN:     ICD-10-CM    1. Plantar fasciitis, right  M72.2 meloxicam (MOBIC) 7.5 MG tablet     Orthotics and Prosthetics DME Orthotic; Foot Orthotics (functional rigid support); Bilateral      2. Plantar fasciitis, left  M72.2 meloxicam (MOBIC) 7.5 MG tablet     Orthotics and Prosthetics DME Orthotic; Foot Orthotics (functional rigid support); Bilateral          I have explained to Fredi about the conditions.  We discussed the underlying contributing factors to the condition as well as treatment options along with expected length of recovery.  At this time, the patient was educated on the importance of offloading supportive shoes and other devices.  I demonstrated to the patient calf stretches to perform every hour daily until symptoms resolve.  After symptoms resolve, the patient was advised to perform the stretches 3 times daily to prevent future recurrence.  The patient was instructed to perform warm soaks with Epson salt after which to also apply over-the-counter Voltaren gel to deeply massage the injured tissue.  The patient was instructed to do this on a daily basis until symptoms resolve.  The patient was prescribed custom orthotics that will aid in offloading the tension forces to the soft tissues and prevent further inflammation.  To reduce the amount of current inflammation, the patient was prescribed Mobic 7.5 mg to be taken daily with food and instructed to stop taking if any stomach irritation or swelling in extremities are noted.  The patient may return in four weeks for reevaluation to determine if any further treatment will be needed.    rFedi verbalized agreement with and understanding of the rational for the diagnosis and treatment plan.  All questions were answered to best of my ability and the patient's satisfaction. The patient was advised to contact the clinic with any questions that may arise after the clinic visit.      Disclaimer: This note consists of symbols  derived from keyboarding, dictation and/or voice recognition software. As a result, there may be errors in the script that have gone undetected. Please consider this when interpreting information found in this chart.       BAIRON Harry D.P.M., JAMAL.F.A.S.

## 2023-07-12 NOTE — LETTER
"    7/12/2023         RE: Fredi Menendez  [id number] [place] [address]        Dear Colleague,    Thank you for referring your patient, Fredi Menendez, to the [facility] [place]. Please see a copy of my visit note below.    PATIENT HISTORY:  Fredi Menendez is a 38 year old adult who presents to clinic as a self referral with a chief complaint of severe pain on the bottom of both feet.  The patient is seen by themselves.  The patient relates the pain is primarily located around the heels.  Reports insidious onset without acute precipitating event. that has been going on for several  year(s).  The patient has previously tried different shoes with little relief.  Any previous notes and studies that pertain to the patient's condition were reviewed.    Pertinent medical, surgical and family history was reviewed in the Epic chart.    Past Medical History:   Past Medical History:   Diagnosis Date     ADD (attention deficit disorder with hyperactivity) 2009       Medications:   Current Outpatient Medications:      meloxicam (MOBIC) 7.5 MG tablet, Take 1 tablet (7.5 mg) by mouth daily, Disp: 30 tablet, Rfl: 1     Allergies:    Allergies   Allergen Reactions     Nasacort [Corticosteroids]      Septra [Bactrim]        Vitals: BP (!) 141/92   Pulse 57   Ht 1.753 m (5' 9\")   Wt 93 kg (205 lb)   BMI 30.27 kg/m    BMI= Body mass index is 30.27 kg/m .    LOWER EXTREMITY PHYSICAL EXAM    Dermatologic: Skin is intact to right and left lower extremity without significant lesions, rash or abrasion.        Vascular: DP & PT pulses are intact & regular on the right and left.   CFT and skin temperature is normal to the right and left lower extremity.     Neurologic: Lower extremity sensation is intact to light touch.  No evidence of weakness in the right and left lower extremity.        Musculoskeletal: Patient is ambulatory without assistive device or brace.  No gross ankle " deformity noted.  No foot or ankle joint effusion is noted.  Noted pain on palpation on the plantar aspect of the right and left heel near the insertion point of the plantar fascia.  No surrounding erythema or edema noted.  Noted tight gastroc complex on the right and left.         ASSESSMENT / PLAN:     ICD-10-CM    1. Plantar fasciitis, right  M72.2 meloxicam (MOBIC) 7.5 MG tablet     Orthotics and Prosthetics DME Orthotic; Foot Orthotics (functional rigid support); Bilateral      2. Plantar fasciitis, left  M72.2 meloxicam (MOBIC) 7.5 MG tablet     Orthotics and Prosthetics DME Orthotic; Foot Orthotics (functional rigid support); Bilateral          I have explained to Fredi about the conditions.  We discussed the underlying contributing factors to the condition as well as treatment options along with expected length of recovery.  At this time, the patient was educated on the importance of offloading supportive shoes and other devices.  I demonstrated to the patient calf stretches to perform every hour daily until symptoms resolve.  After symptoms resolve, the patient was advised to perform the stretches 3 times daily to prevent future recurrence.  The patient was instructed to perform warm soaks with Epson salt after which to also apply over-the-counter Voltaren gel to deeply massage the injured tissue.  The patient was instructed to do this on a daily basis until symptoms resolve.  The patient was prescribed custom orthotics that will aid in offloading the tension forces to the soft tissues and prevent further inflammation.  To reduce the amount of current inflammation, the patient was prescribed Mobic 7.5 mg to be taken daily with food and instructed to stop taking if any stomach irritation or swelling in extremities are noted.  The patient may return in four weeks for reevaluation to determine if any further treatment will be needed.    Fredi verbalized agreement with and understanding of the  rational for the diagnosis and treatment plan.  All questions were answered to best of my ability and the patient's satisfaction. The patient was advised to contact the clinic with any questions that may arise after the clinic visit.      Disclaimer: This note consists of symbols derived from keyboarding, dictation and/or voice recognition software. As a result, there may be errors in the script that have gone undetected. Please consider this when interpreting information found in this chart.       BAIRON Harry D.P.M., F.A.C.F.A.S.        Again, thank you for allowing me to participate in the care of your patient.        Sincerely,        Alex Harry DPM

## 2023-07-12 NOTE — PATIENT INSTRUCTIONS

## 2023-11-19 ENCOUNTER — HEALTH MAINTENANCE LETTER (OUTPATIENT)
Age: 38
End: 2023-11-19

## 2023-12-11 NOTE — TELEPHONE ENCOUNTER
I would be able to review the order and advise pt of the referral scheduling contact information/#. I do not see that either. I am unsure what happens after an E-Consult.     Routing to PCP/care team for advisement.     Care Coordination    Statement Selected

## 2024-12-29 ENCOUNTER — HEALTH MAINTENANCE LETTER (OUTPATIENT)
Age: 39
End: 2024-12-29